# Patient Record
Sex: FEMALE | Race: OTHER | Employment: PART TIME | URBAN - METROPOLITAN AREA
[De-identification: names, ages, dates, MRNs, and addresses within clinical notes are randomized per-mention and may not be internally consistent; named-entity substitution may affect disease eponyms.]

---

## 2024-04-23 ENCOUNTER — OFFICE VISIT (OUTPATIENT)
Dept: GYNECOLOGY | Facility: CLINIC | Age: 34
End: 2024-04-23
Payer: COMMERCIAL

## 2024-04-23 VITALS
DIASTOLIC BLOOD PRESSURE: 72 MMHG | SYSTOLIC BLOOD PRESSURE: 108 MMHG | WEIGHT: 146.2 LBS | BODY MASS INDEX: 29.48 KG/M2 | HEIGHT: 59 IN

## 2024-04-23 DIAGNOSIS — Z01.419 WOMEN'S ANNUAL ROUTINE GYNECOLOGICAL EXAMINATION: Primary | ICD-10-CM

## 2024-04-23 DIAGNOSIS — Z11.3 SCREENING FOR STD (SEXUALLY TRANSMITTED DISEASE): ICD-10-CM

## 2024-04-23 DIAGNOSIS — N76.0 BV (BACTERIAL VAGINOSIS): ICD-10-CM

## 2024-04-23 DIAGNOSIS — N89.8 VAGINAL DISCHARGE: ICD-10-CM

## 2024-04-23 DIAGNOSIS — B96.89 BV (BACTERIAL VAGINOSIS): ICD-10-CM

## 2024-04-23 DIAGNOSIS — N92.0 MENORRHAGIA WITH REGULAR CYCLE: ICD-10-CM

## 2024-04-23 DIAGNOSIS — R10.2 PELVIC PAIN: ICD-10-CM

## 2024-04-23 DIAGNOSIS — N94.6 DYSMENORRHEA: ICD-10-CM

## 2024-04-23 PROCEDURE — S0610 ANNUAL GYNECOLOGICAL EXAMINA: HCPCS | Performed by: OBSTETRICS & GYNECOLOGY

## 2024-04-23 RX ORDER — METRONIDAZOLE 500 MG/1
500 TABLET ORAL EVERY 12 HOURS SCHEDULED
Qty: 14 TABLET | Refills: 0 | Status: SHIPPED | OUTPATIENT
Start: 2024-04-23 | End: 2024-04-30

## 2024-04-23 NOTE — PROGRESS NOTES
"Assessment   Annual well woman exam  Vaginal discharge/BV  Desires pregnancy  Dysmenorrhea.  Menorrhagia with regular cycle  Screening for STDs       Plan   Screening laboratory studies ordered  Pelvic ultrasound ordered  Metronidazole ordered for BV  Recommended no intercourse until completing treatment, no alcohol with this medication  Follow-up in 2 weeks for test results   All questions answered.  Await pap smear results.  Breast self exam technique reviewed and patient encouraged to perform self-exam monthly.  Discussed healthy lifestyle modifications.     Subjective here for annual exam     Johnathon Piper is a 33 y.o. female who presents for annual exam.  Not currently on birth control.  She does start to get pregnant in the near future.  Has painful periods sometimes heavy.  Periods are regular every 28-30 days, lasting 5 days. Dysmenorrhea:moderate, occurring throughout menses. Cyclic symptoms include pelvic pain. No intermenstrual bleeding, spotting, or discharge. The patient reports that there is not domestic violence in her life.     Current contraception: none  History of abnormal Pap smear: no  Family history of uterine or ovarian cancer: no  Regular self breast exam: yes  History of abnormal mammogram: no  Family history of breast cancer: no  History of abnormal lipids: no  Menstrual History:  OB History          0    Para   0    Term   0       0    AB   0    Living   0         SAB   0    IAB   0    Ectopic   0    Multiple   0    Live Births   0                Menarche age: 13  Patient's last menstrual period was 2024.     Review of Systems  Pertinent items are noted in HPI.      Objective no acute distress     /72 (BP Location: Left arm, Patient Position: Sitting, Cuff Size: Standard)   Ht 4' 10.5\" (1.486 m)   Wt 66.3 kg (146 lb 3.2 oz)   LMP 2024   BMI 30.04 kg/m²     General:   alert and oriented, in no acute distress, alert, appears stated age, and cooperative "   Heart: regular rate and rhythm, S1, S2 normal, no murmur, click, rub or gallop   Lungs: clear to auscultation bilaterally   Abdomen: soft, non-tender, without masses or organomegaly   Vulva: normal, Bartholin's, Urethra, [place]'s normal, female escutcheon   Vagina: normal mucosa, no palpable nodules, yellow vaginal discharge wet prep done, clue cells identified consistent with bacterial vaginosis   Cervix: anteverted, no bleeding following Pap, no cervical motion tenderness, no lesions, and nulliparous appearance   Uterus: normal size, anteverted, mobile, non-tender, normal shape and consistency   Adnexa: normal adnexa and no mass, fullness, tenderness   Bilateral breast exam in the sitting and supine position with chaperone present, no visible or palpable breast lesions identified.  No breast masses noted.    No supraclavicular or axillary lymphadenopathy noted.  No nipple discharge.   Reviewed self-breast exam techniques.   Rectal exam,  deferred

## 2024-04-25 DIAGNOSIS — B37.31 VAGINAL YEAST INFECTION: Primary | ICD-10-CM

## 2024-04-25 LAB
CANDIDA RRNA VAG QL PROBE: POSITIVE
G VAGINALIS RRNA GENITAL QL PROBE: POSITIVE
T VAGINALIS RRNA GENITAL QL PROBE: NEGATIVE

## 2024-04-25 RX ORDER — FLUCONAZOLE 150 MG/1
150 TABLET ORAL ONCE
Qty: 1 TABLET | Refills: 0 | Status: SHIPPED | OUTPATIENT
Start: 2024-04-25 | End: 2024-04-25

## 2024-04-29 LAB
C TRACH RRNA CVX QL NAA+PROBE: NEGATIVE
CYTOLOGIST CVX/VAG CYTO: NORMAL
DX ICD CODE: NORMAL
HPV GENOTYPE REFLEX: NORMAL
HPV I/H RISK 4 DNA CVX QL PROBE+SIG AMP: NEGATIVE
N GONORRHOEA RRNA CVX QL NAA+PROBE: NEGATIVE
OTHER STN SPEC: NORMAL
PATH REPORT.FINAL DX SPEC: NORMAL
SL AMB NOTE:: NORMAL
SL AMB SPECIMEN ADEQUACY: NORMAL
SL AMB TEST METHODOLOGY: NORMAL

## 2024-05-05 LAB
ERYTHROCYTE [DISTWIDTH] IN BLOOD BY AUTOMATED COUNT: 14.1 % (ref 11.7–15.4)
HCG INTACT+B SERPL-ACNC: 1389 MIU/ML
HCT VFR BLD AUTO: 40.7 % (ref 34–46.6)
HGB BLD-MCNC: 13.2 G/DL (ref 11.1–15.9)
MCH RBC QN AUTO: 28 PG (ref 26.6–33)
MCHC RBC AUTO-ENTMCNC: 32.4 G/DL (ref 31.5–35.7)
MCV RBC AUTO: 86 FL (ref 79–97)
PLATELET # BLD AUTO: 254 X10E3/UL (ref 150–450)
RBC # BLD AUTO: 4.72 X10E6/UL (ref 3.77–5.28)
TSH SERPL DL<=0.005 MIU/L-ACNC: 1.53 UIU/ML (ref 0.45–4.5)
WBC # BLD AUTO: 7.4 X10E3/UL (ref 3.4–10.8)

## 2024-05-06 ENCOUNTER — HOSPITAL ENCOUNTER (OUTPATIENT)
Dept: ULTRASOUND IMAGING | Facility: HOSPITAL | Age: 34
Discharge: HOME | End: 2024-05-06
Attending: OBSTETRICS & GYNECOLOGY
Payer: COMMERCIAL

## 2024-05-06 DIAGNOSIS — N94.6 DYSMENORRHEA: ICD-10-CM

## 2024-05-06 DIAGNOSIS — R10.2 PELVIC PAIN: ICD-10-CM

## 2024-05-06 DIAGNOSIS — N92.0 MENORRHAGIA WITH REGULAR CYCLE: ICD-10-CM

## 2024-05-06 PROCEDURE — 76801 OB US < 14 WKS SINGLE FETUS: CPT

## 2024-05-07 ENCOUNTER — NURSE TRIAGE (OUTPATIENT)
Age: 34
End: 2024-05-07

## 2024-05-07 NOTE — TELEPHONE ENCOUNTER
Regarding: Pregnant and abdominal pain  ----- Message from Adelaida Rodriguez sent at 5/7/2024 10:27 AM EDT -----  Pt is pregnant, unsure of exact LMP, somewhere between 3/31 and 4/5. Is experiencing pain in lower abdomen and back that is worsening. Needs a .

## 2024-05-07 NOTE — TELEPHONE ENCOUNTER
"Call connected with [place] speaking . # [id number].   Johnathon is reporting increased generalized pelvic pain going down legs, into knees. Pain into back, states pain is getting worse each day and is now severe.  Denies n/v/d. Feels constipated however had b/m this morning. Denies vaginal bleeding.  + Pain with urination and frequency.      Unknown LMP between 3/31-4/5. + HPT 9 days ago when pain started.     Had ultrasound done yesterday-   pending.  Lab results in chart pending review.    Strongly encouraged if  having doubling over type pain or severe pain needs to proceed to ED for evaluation.     Spoke with Una in Dr. Riedel office. She will review with Dr. Riedel.       Pelvic u/s done yesterday, contacted Daniella in reading room, she will request u/s be read today.         Dr. Riedel, please provide additional recommendations    Reason for Disposition   MODERATE-SEVERE abdominal pain (e.g., interferes with normal activities, awakens from sleep)     Patient aware to proceed to ED for severe pain. She would like u/s and labs reviewed prior    Answer Assessment - Initial Assessment Questions  1. LOCATION: \"Where does it hurt?\"       All over pelvic area x 9 days  2. RADIATION: \"Does the pain shoot anywhere else?\" (e.g., chest, back, shoulder)      Down legs into knees, around to back  3. ONSET: \"When did the pain begin?\" (e.g., minutes, hours or days ago)       9 days   4. ONSET: \"Gradual or sudden onset?\"      Gradual, has gotten worse -states cannot continue  5. PATTERN \"Does the pain come and go, or has it been constant since it started?\"       Intermittent-pain keeps awake at night  6. SEVERITY: \"How bad is the pain?\" \"What does it keep you from doing?\"  (e.g., Scale 1-10; mild, moderate, or severe)    - MILD (1-3): doesn't interfere with normal activities, abdomen soft and not tender to touch     - MODERATE (4-7): interferes with normal activities or awakens from sleep, tender to touch     - SEVERE " "(8-10): excruciating pain, doubled over, unable to do any normal activities      severe  7. RECURRENT SYMPTOM: \"Have you ever had this type of stomach pain before?\" If Yes, ask: \"When was the last time?\" and \"What happened that time?\"       no  8. CAUSE: \"What do you think is causing the stomach pain?\"      unknown  9. RELIEVING/AGGRAVATING FACTORS: \"What makes it better or worse?\" (e.g., antacids, bowel movement, movement)      none  10. OTHER SYMPTOMS: \"Has there been any vaginal bleeding, fever, vomiting, diarrhea, or urine problems?\"        Feels constipated- moved bowels this morning. Pain with urination./frequency  11. ROSANNA: \"What date are you expecting to deliver?\"  Unknown LMP unknown between 3/31/2024-4/5.    Protocols used: Pregnancy - Abdominal Pain Less Than 20 Weeks EGA-ADULT-AH    "

## 2024-05-07 NOTE — TELEPHONE ENCOUNTER
Reviewed recent blood test, pregnancy test positive.  Pregnancy reveals intrauterine pregnancy, less than 6 weeks  No signs of tubal pregnancy/ectopic  Recommend she follow-up with physician who still provides obstetrical services at her earliest convenience please call our office at your earliest convenience for a list of eligible providers

## 2024-05-08 NOTE — TELEPHONE ENCOUNTER
Connecting with [place] speaking  #[id number].      Patient had ultrasound completed by Dr. Bello at [facility] that was a confirmed IUP.  Patient was advised to seek obstetric care as soon as she can.  Patient states that her approximate LMP was 3/31/24.  Dating and viability appointment was scheduled.      Patient is stating that she is having persistent pain in her lower abdomen and lower back.  She recently had labs on 4/23/24 done that showed she had bacterial vaginosis and was prescribed metronidazole and fluconazole.  Patient states she completed prescriptions and she is still having persistent lower abdominal pain and lower back pain.      Scheduled patient for a sooner appointment to be checked out.  Advised that if pelvic pain/abdominal pain becomes worse, patient needs to proceed to the urgent care or ER to be further evaluated and quicker evaluation.  Patient verbalized understanding and voiced appreciation for phone call.

## 2024-05-10 ENCOUNTER — OFFICE VISIT (OUTPATIENT)
Age: 34
End: 2024-05-10
Payer: COMMERCIAL

## 2024-05-10 ENCOUNTER — TELEPHONE (OUTPATIENT)
Age: 34
End: 2024-05-10

## 2024-05-10 ENCOUNTER — APPOINTMENT (OUTPATIENT)
Dept: LAB | Facility: HOSPITAL | Age: 34
End: 2024-05-10
Payer: COMMERCIAL

## 2024-05-10 VITALS
SYSTOLIC BLOOD PRESSURE: 118 MMHG | BODY MASS INDEX: 29.96 KG/M2 | HEIGHT: 59 IN | DIASTOLIC BLOOD PRESSURE: 70 MMHG | WEIGHT: 148.6 LBS

## 2024-05-10 DIAGNOSIS — N92.6 IRREGULAR MENSTRUAL CYCLE: Primary | ICD-10-CM

## 2024-05-10 DIAGNOSIS — O26.891 PELVIC PAIN AFFECTING PREGNANCY IN FIRST TRIMESTER, ANTEPARTUM: ICD-10-CM

## 2024-05-10 DIAGNOSIS — N92.6 MISSED MENSES: Primary | ICD-10-CM

## 2024-05-10 DIAGNOSIS — R10.2 PELVIC PAIN AFFECTING PREGNANCY IN FIRST TRIMESTER, ANTEPARTUM: ICD-10-CM

## 2024-05-10 LAB
B-HCG SERPL-ACNC: ABNORMAL MIU/ML (ref 0–5)
SL AMB POCT URINE HCG: POSITIVE

## 2024-05-10 PROCEDURE — 81025 URINE PREGNANCY TEST: CPT | Performed by: OBSTETRICS & GYNECOLOGY

## 2024-05-10 PROCEDURE — 36415 COLL VENOUS BLD VENIPUNCTURE: CPT

## 2024-05-10 PROCEDURE — 99214 OFFICE O/P EST MOD 30 MIN: CPT | Performed by: OBSTETRICS & GYNECOLOGY

## 2024-05-10 PROCEDURE — 84702 CHORIONIC GONADOTROPIN TEST: CPT

## 2024-05-10 RX ORDER — CEPHALEXIN 500 MG/1
CAPSULE ORAL
COMMUNITY
Start: 2024-02-16

## 2024-05-10 RX ORDER — TRIAMCINOLONE ACETONIDE 1 MG/G
CREAM TOPICAL
COMMUNITY
Start: 2024-02-16

## 2024-05-10 NOTE — PROGRESS NOTES
Assessment Johnathon was seen today for pelvic pain.    Diagnoses and all orders for this visit:    Missed menses  -     POCT urine HCG  -     hCG, quantitative; Future  -     Cancel: hCG, quantitative    Pelvic pain affecting pregnancy in first trimester, antepartum         Plan  Today I reviewed with patient and mother her  BHCG from  5/4 showing level 1389 and her  US done showing intrauterine gestational sac with yolk sac present, fetal pole not currently identified. Follow-up recommended..  This US was done  on 5/6/2024. Aware too early to repeat in order to confirm if viable pregnancy  . Recommend repeating BHCG and scheduled for  viability scan with me   Recommend initiation of  PNV   We also discussed pain and bleeding precautions with patient and mother            Subjective   Johnathon HECTOR is a 34 y.o. female here for a problem visit.  Patient is complaining of not understanding what is going on with her pregnancy  . She state she was seen at Dr Riedel office but was not fully understanding plan of care secondary to language barrier. She states LMP 3/31/2024  with positive UPT . Was seen for US at  previous GYN and told too early . (This was on  5/6/2024)   State she has been overall ok except for experiencing intense pain at times in her mid pelvis and back . Today not having pain . Denies vaginal bleeding  . This is a desired pregnancy and she currently is  not on any medication  There is no problem list on file for this patient.      Gynecologic History  Patient's last menstrual period was 03/31/2024.  The current method of family planning is none.    No past medical history on file.  No past surgical history on file.  No family history on file.  Social History     Socioeconomic History    Marital status: /Civil Union     Spouse name: Not on file    Number of children: Not on file    Years of education: Not on file    Highest education level: Not on file   Occupational History    Not on file    Tobacco Use    Smoking status: Never    Smokeless tobacco: Never   Substance and Sexual Activity    Alcohol use: Not Currently    Drug use: Never    Sexual activity: Yes     Partners: Male   Other Topics Concern    Not on file   Social History Narrative    Not on file     Social Determinants of Health     Financial Resource Strain: Not on file   Food Insecurity: Not on file   Transportation Needs: Not on file   Physical Activity: Not on file   Stress: Not on file   Social Connections: Not on file   Intimate Partner Violence: Not on file   Housing Stability: Not on file     No Known Allergies    Current Outpatient Medications:     cephalexin (KEFLEX) 500 mg capsule, TAKE 2 CAPSULE BY ORAL ROUTE 2 TIMES EVERY DAY FOR URINARY TRACT INFECTION (Patient not taking: Reported on 5/10/2024), Disp: , Rfl:     triamcinolone (KENALOG) 0.1 % cream, PLEASE SEE ATTACHED FOR DETAILED DIRECTIONS (Patient not taking: Reported on 5/10/2024), Disp: , Rfl:     Review of Systems  Constitutional :no fever, feels well, no tiredness, no recent weight gain or loss  ENT: no ear ache, no loss of hearing, no nosebleeds or nasal discharge, no sore throat or hoarseness.  Cardiovascular: no complaints of slow or fast heart beat, no chest pain, no palpitations, no leg claudication or lower extremity edema.  Respiratory: no complaints of shortness of shortness of breath, no PURVIS  Breasts:no complaints of breast pain, breast lump, or nipple discharge  Gastrointestinal: no complaints of abdominal pain, constipation, nausea, vomiting, or diarrhea or bloody stools  Genitourinary : no complaints of dysuria, incontinence, pelvic pain, no dysmenorrhea, vaginal discharge or abnormal vaginal bleeding and as noted in HPI.  Musculoskeletal: no complaints of arthralgia, no myalgia, no joint swelling or stiffness, no limb pain or swelling.  Integumentary: no complaints of skin rash or lesion, itching or dry skin  Neurological: no complaints of headache, no  "confusion, no numbness or tingling, no dizziness or fainting     Objective     /70   Ht 4' 10.5\" (1.486 m)   Wt 67.4 kg (148 lb 9.6 oz)   LMP 03/31/2024   BMI 30.53 kg/m²     General:   appears stated age, cooperative, alert normal mood and affect   Lungs: Unlabored breathing     Abdomen: soft, non-tender, without masses or organomegaly   Psychiatric orientation to person, place, and time: normal. mood and affect: normal      "

## 2024-05-10 NOTE — TELEPHONE ENCOUNTER
Pt called tors 5/30/24 appt.  For later in the day or 5/31/24.  Next available appt with same physician was 6/4/24. Pt keeping 5/30/24 appt

## 2024-05-30 ENCOUNTER — ULTRASOUND (OUTPATIENT)
Dept: OBGYN CLINIC | Facility: MEDICAL CENTER | Age: 34
End: 2024-05-30
Payer: COMMERCIAL

## 2024-05-30 VITALS
WEIGHT: 143.6 LBS | DIASTOLIC BLOOD PRESSURE: 56 MMHG | HEIGHT: 58 IN | SYSTOLIC BLOOD PRESSURE: 100 MMHG | BODY MASS INDEX: 30.14 KG/M2

## 2024-05-30 DIAGNOSIS — N92.6 MISSED MENSES: ICD-10-CM

## 2024-05-30 DIAGNOSIS — O21.9 NAUSEA AND VOMITING IN PREGNANCY: Primary | ICD-10-CM

## 2024-05-30 DIAGNOSIS — Z36.9 FIRST TRIMESTER SCREENING: ICD-10-CM

## 2024-05-30 PROCEDURE — 99214 OFFICE O/P EST MOD 30 MIN: CPT | Performed by: OBSTETRICS & GYNECOLOGY

## 2024-05-30 PROCEDURE — 76817 TRANSVAGINAL US OBSTETRIC: CPT | Performed by: OBSTETRICS & GYNECOLOGY

## 2024-05-30 RX ORDER — DOXYLAMINE SUCCINATE AND PYRIDOXINE HYDROCHLORIDE, DELAYED RELEASE TABLETS 10 MG/10 MG 10; 10 MG/1; MG/1
1 TABLET, DELAYED RELEASE ORAL DAILY
Qty: 30 TABLET | Refills: 2 | Status: SHIPPED | OUTPATIENT
Start: 2024-05-30 | End: 2024-06-29

## 2024-05-30 NOTE — PROGRESS NOTES
Assessment Johnathon was seen today for pregnancy ultrasound.    Diagnoses and all orders for this visit:    Nausea and vomiting in pregnancy  -     Doxylamine-Pyridoxine 10-10 MG TBEC; Take 1 tablet by mouth in the morning    First trimester screening  -     Ambulatory Referral to Maternal Fetal Medicine; Future    Missed menses  -     AMB US OB < 14 weeks single or first gestation level 1         Plan  Live  IUP   EDC  1/5/2025 based on LMP  We discussed small frequent meals for nausea and vomitng , to call if  not tolerating anything   Will schedule OB intake   Referral to [facility] given as she is interested in genetic screening   Subjective   Johnathon HECTOR is a 34 y.o. female here for a problem visit.  Patient is complaining of missed menses , LMP 3/31/2024 . States has been having nausea and vomiting as well as lower pelvic cramps  .There is no problem list on file for this patient.      Gynecologic History  Patient's last menstrual period was 03/31/2024 (exact date).  The current method of family planning is none.    No past medical history on file.  No past surgical history on file.  No family history on file.  Social History     Socioeconomic History    Marital status: /Civil Union     Spouse name: Not on file    Number of children: Not on file    Years of education: Not on file    Highest education level: Not on file   Occupational History    Not on file   Tobacco Use    Smoking status: Never    Smokeless tobacco: Never   Substance and Sexual Activity    Alcohol use: Not Currently    Drug use: Never    Sexual activity: Yes     Partners: Male   Other Topics Concern    Not on file   Social History Narrative    Not on file     Social Determinants of Health     Financial Resource Strain: Not on file   Food Insecurity: Not on file   Transportation Needs: Not on file   Physical Activity: Not on file   Stress: Not on file   Social Connections: Not on file   Intimate Partner Violence: Not on file   Housing  "Stability: Not on file     No Known Allergies    Current Outpatient Medications:     cephalexin (KEFLEX) 500 mg capsule, , Disp: , Rfl:     triamcinolone (KENALOG) 0.1 % cream, , Disp: , Rfl:     Review of Systems  Constitutional :no fever, feels well, no tiredness, no recent weight gain or loss  ENT: no ear ache, no loss of hearing, no nosebleeds or nasal discharge, no sore throat or hoarseness.  Cardiovascular: no complaints of slow or fast heart beat, no chest pain, no palpitations, no leg claudication or lower extremity edema.  Respiratory: no complaints of shortness of shortness of breath, no PURVIS  Breasts:no complaints of breast pain, breast lump, or nipple discharge  Gastrointestinal: no complaints of abdominal pain, constipation, nausea, vomiting, or diarrhea or bloody stools  Genitourinary : no complaints of dysuria, incontinence, pelvic pain, no dysmenorrhea, vaginal discharge or abnormal vaginal bleeding and as noted in HPI.  Musculoskeletal: no complaints of arthralgia, no myalgia, no joint swelling or stiffness, no limb pain or swelling.  Integumentary: no complaints of skin rash or lesion, itching or dry skin  Neurological: no complaints of headache, no confusion, no numbness or tingling, no dizziness or fainting     Objective     /56   Ht 4' 10\" (1.473 m)   Wt 65.1 kg (143 lb 9.6 oz)   LMP 03/31/2024 (Exact Date)   BMI 30.01 kg/m²     General:   appears stated age, cooperative, alert normal mood and affect   Lungs: Unlabored     Abdomen: soft, non-tender, without masses or organomegaly   Vulva: normal, normal female genitalia, Bartholin's, Urethra, [place] normal, no lesions, normal female hair distribution, no clitoral enlargement   Psychiatric orientation to person, place, and time: normal. mood and affect: normal      "

## 2024-05-31 ENCOUNTER — TELEPHONE (OUTPATIENT)
Age: 34
End: 2024-05-31

## 2024-06-11 ENCOUNTER — TELEPHONE (OUTPATIENT)
Age: 34
End: 2024-06-11

## 2024-06-11 NOTE — PATIENT INSTRUCTIONS
¡¡Felicidades!! Revise nuestra Guía esencial para el embarazo en el sitio web de nuestra red.    [facility] Pregnancy Essentials Guide  [facility] ([email or web address])

## 2024-06-11 NOTE — PROGRESS NOTES
OB INTAKE INTERVIEW 2024    Patient is 34 y.o. who presents for OB intake at 10w3d.  She is not accompanied by anyone during this encounter.  The father of her baby (Edmund) is involved in the pregnancy.      Patient's last menstrual period was 2024 (exact date).  Ultrasound: Measured 8 weeks 3 days on 2024  Estimated Date of Delivery: 2025 confirmed by dating ultrasound.    Signs/Symptoms of Pregnancy  Current pregnancy symptoms: fatigue and nausea  Constipation YES  Headaches YES  Cramping/spotting no  PICA cravings no    Diabetes-  Body mass index is 30.01 kg/m².  If patient has 1 or more, please order early 1 hour GTT  History of GDM no  BMI >35 no  History of PCOS or current metformin use no  History of LGA/macrosomic infant (4000g/9lbs) no    If patient has 2 or more, please order early 1 hour GTT  BMI>30 YES  AMA no  First degree relative with type 2 diabetes no  History of chronic HTN, hyperlipidemia, elevated A1C no  High risk race (, , ,  or ) YES    Hypertension- if you answer yes to any of the following, please order baseline preeclampsia labs (cbc, comprehensive metabolic panel, urine protein creatinine ratio, uric acid)  History of of chronic HTN no  History of gestational HTN no  History of preeclampsia, eclampsia, or HELLP syndrome no  History of diabetes no  History of lupus, autoimmune disease, kidney disease no    Thyroid- if yes order TSH with reflex T4  History of thyroid disease no    Bleeding Disorder or Hx of DVT-patient or first degree relative with history of. Order the following if not done previously.   (Factor V, antithrombin III, prothrombin gene mutation, protein C and S Ag, lupus anticoagulant, anticardiolipin, beta-2 glycoprotein)   no    OB/GYN-  History of abnormal pap smear no  - 2024 WNL    Date of last pap smear 2024  History of HPV no  History of Herpes/HSV no  History of other  STI (gonorrhea, chlamydia, trich) no  History of prior  no  History of prior  no  History of  delivery prior to 36 weeks 6 days no  History of blood transfusion no  Ok for blood transfusion YES    Substance screening-   History of tobacco use no  Currently using tobacco no  Currently using alcohol no  Presently using drugs no  Past drug use  no  IV drug use- no  Partner drug use no  Parent/Family drug use no    Substance Use Screen Level- no risk    MRSA Screening-   Does the pt have a hx of MRSA? no    Immunizations:  Influenza vaccine given this season YES - Received in November  Discussed Tdap vaccine YES   COVID Vaccine YES x2    Genetic/[facility]-  Do you or your partner have a history of any of the following in yourselves or first degree relatives?  Cystic fibrosis no  Spinal muscular atrophy no  Hemoglobinopathy/Sickle Cell/Thalassemia no  Fragile X Intellectual Disability no    If no, discuss Carrier Screening being completed once in a lifetime as a standard of care lab test. Place orders for Cystic Fibrosis Gene Test (SKA922) and Spinal Muscular Atrophy DNA ([id number]).  Patient does not desire testing for Cystic Fibrosis and Spinal Muscular Atrophy.  Orders placed.    Appointment for Nuchal Translucency Ultrasound at [facility]  has not been scheduled. Encouraged to schedule.    Interview education  [facility] Pregnancy Essentials Book reviewed, discussed and attached to their AVS YES     Prenatal lab work scripts YES    Extra labs ordered: Cystic Fibrosis gene test, Spinal muscular atrophy DNA, and 1 hour GTT    Aspirin/Preeclampsia Screen    Risk Level Risk Factor Recommendation   LOW Prior Uncomplicated full-term delivery no No Aspirin recommendation        MODERATE Nulliparity YES Recommend low-dose aspirin if     BMI>30 YES 2 or more moderate risk factors    Family History Preeclampsia (mother/sister) no     35yr old or greater no      or Low Socioeconomic no     IVF Pregnancy  no      Personal History Risks (low birth weight, prior adverse preg outcome, >10yr preg interval) no         HIGH History of Preeclampsia no Recommend low-dose aspirin if     Multifetal gestation no 1 or more high risk factors    Chronic HTN no     Type 1 or 2 Diabetes no     Renal Disease no     Autoimmune Disease  no      Contraindications to ASA therapy:  NSAID/ ASA allergy: no  Nasal polyps: no  Asthma with history of ASA induced bronchospasm: no  Relative contraindications:  History of GI bleed: no  Active peptic ulcer disease: no  Severe hepatic dysfunction: no    Patient does meet recommendation to take ASA 162mg during this pregnancy from 12-36wks to lower her risk of preeclampsia.  Instructions given and patient verbalized understanding.    The patient has a history now or in prior pregnancy notable for: none    Details that I feel the provider should be aware of: Johnathon came in for her OB intake at 10w3d. Patient c/o fatigue, nausea, constipation and headaches. Safe medications to take during pregnancy and warning signs reviewed.  Patient moved to the  in December from . Patient reports may have had multiple losses in the past but it was never confirmed- denies taking pregnancy tests. Prenatal panel and early 1 hour glucose ordered. Patient has not scheduled NT- Encouraged to call and schedule.    PN1 visit scheduled. The patient was oriented to our practice, the navigator role, reviewed delivering physicians and [facility] for delivery. All questions were answered.    Interviewed by: Rivera Chopra RN

## 2024-06-12 ENCOUNTER — APPOINTMENT (OUTPATIENT)
Dept: LAB | Facility: MEDICAL CENTER | Age: 34
End: 2024-06-12
Payer: COMMERCIAL

## 2024-06-12 ENCOUNTER — TELEPHONE (OUTPATIENT)
Age: 34
End: 2024-06-12

## 2024-06-12 ENCOUNTER — INITIAL PRENATAL (OUTPATIENT)
Dept: OBGYN CLINIC | Facility: MEDICAL CENTER | Age: 34
End: 2024-06-12

## 2024-06-12 VITALS
WEIGHT: 143.6 LBS | HEIGHT: 58 IN | DIASTOLIC BLOOD PRESSURE: 54 MMHG | BODY MASS INDEX: 30.14 KG/M2 | SYSTOLIC BLOOD PRESSURE: 98 MMHG

## 2024-06-12 DIAGNOSIS — Z34.01 ENCOUNTER FOR SUPERVISION OF NORMAL FIRST PREGNANCY IN FIRST TRIMESTER: Primary | ICD-10-CM

## 2024-06-12 DIAGNOSIS — Z31.430 ENCOUNTER OF FEMALE FOR TESTING FOR GENETIC DISEASE CARRIER STATUS FOR PROCREATIVE MANAGEMENT: ICD-10-CM

## 2024-06-12 DIAGNOSIS — Z34.01 ENCOUNTER FOR SUPERVISION OF NORMAL FIRST PREGNANCY IN FIRST TRIMESTER: ICD-10-CM

## 2024-06-12 LAB
ABO GROUP BLD: NORMAL
BACTERIA UR QL AUTO: ABNORMAL /HPF
BASOPHILS # BLD AUTO: 0.02 THOUSANDS/ÂΜL (ref 0–0.1)
BASOPHILS NFR BLD AUTO: 0 % (ref 0–1)
BILIRUB UR QL STRIP: NEGATIVE
BLD GP AB SCN SERPL QL: NEGATIVE
CLARITY UR: CLEAR
COLOR UR: ABNORMAL
EOSINOPHIL # BLD AUTO: 0.08 THOUSAND/ÂΜL (ref 0–0.61)
EOSINOPHIL NFR BLD AUTO: 1 % (ref 0–6)
ERYTHROCYTE [DISTWIDTH] IN BLOOD BY AUTOMATED COUNT: 13.5 % (ref 11.6–15.1)
GLUCOSE UR STRIP-MCNC: NEGATIVE MG/DL
HCT VFR BLD AUTO: 38.4 % (ref 34.8–46.1)
HGB BLD-MCNC: 12.5 G/DL (ref 11.5–15.4)
HGB UR QL STRIP.AUTO: NEGATIVE
IMM GRANULOCYTES # BLD AUTO: 0.04 THOUSAND/UL (ref 0–0.2)
IMM GRANULOCYTES NFR BLD AUTO: 0 % (ref 0–2)
KETONES UR STRIP-MCNC: NEGATIVE MG/DL
LEUKOCYTE ESTERASE UR QL STRIP: ABNORMAL
LYMPHOCYTES # BLD AUTO: 3.19 THOUSANDS/ÂΜL (ref 0.6–4.47)
LYMPHOCYTES NFR BLD AUTO: 30 % (ref 14–44)
MCH RBC QN AUTO: 28.3 PG (ref 26.8–34.3)
MCHC RBC AUTO-ENTMCNC: 32.6 G/DL (ref 31.4–37.4)
MCV RBC AUTO: 87 FL (ref 82–98)
MONOCYTES # BLD AUTO: 0.56 THOUSAND/ÂΜL (ref 0.17–1.22)
MONOCYTES NFR BLD AUTO: 5 % (ref 4–12)
MUCOUS THREADS UR QL AUTO: ABNORMAL
NEUTROPHILS # BLD AUTO: 6.61 THOUSANDS/ÂΜL (ref 1.85–7.62)
NEUTS SEG NFR BLD AUTO: 64 % (ref 43–75)
NITRITE UR QL STRIP: NEGATIVE
NON-SQ EPI CELLS URNS QL MICRO: ABNORMAL /HPF
NRBC BLD AUTO-RTO: 0 /100 WBCS
PH UR STRIP.AUTO: 6 [PH]
PLATELET # BLD AUTO: 260 THOUSANDS/UL (ref 149–390)
PMV BLD AUTO: 11.6 FL (ref 8.9–12.7)
PROT UR STRIP-MCNC: NEGATIVE MG/DL
RBC # BLD AUTO: 4.41 MILLION/UL (ref 3.81–5.12)
RBC #/AREA URNS AUTO: ABNORMAL /HPF
RH BLD: POSITIVE
RUBV IGG SERPL IA-ACNC: 154.1 IU/ML
SP GR UR STRIP.AUTO: 1.01 (ref 1–1.03)
SPECIMEN EXPIRATION DATE: NORMAL
UROBILINOGEN UR STRIP-ACNC: <2 MG/DL
WBC # BLD AUTO: 10.5 THOUSAND/UL (ref 4.31–10.16)
WBC #/AREA URNS AUTO: ABNORMAL /HPF

## 2024-06-12 PROCEDURE — 86901 BLOOD TYPING SEROLOGIC RH(D): CPT

## 2024-06-12 PROCEDURE — OBC

## 2024-06-12 PROCEDURE — 86850 RBC ANTIBODY SCREEN: CPT

## 2024-06-12 PROCEDURE — 86780 TREPONEMA PALLIDUM: CPT

## 2024-06-12 PROCEDURE — 87389 HIV-1 AG W/HIV-1&-2 AB AG IA: CPT

## 2024-06-12 PROCEDURE — 87086 URINE CULTURE/COLONY COUNT: CPT

## 2024-06-12 PROCEDURE — 36415 COLL VENOUS BLD VENIPUNCTURE: CPT

## 2024-06-12 PROCEDURE — 86803 HEPATITIS C AB TEST: CPT

## 2024-06-12 PROCEDURE — 81001 URINALYSIS AUTO W/SCOPE: CPT

## 2024-06-12 PROCEDURE — 86706 HEP B SURFACE ANTIBODY: CPT

## 2024-06-12 PROCEDURE — 86900 BLOOD TYPING SEROLOGIC ABO: CPT

## 2024-06-12 PROCEDURE — 85025 COMPLETE CBC W/AUTO DIFF WBC: CPT

## 2024-06-12 PROCEDURE — 86762 RUBELLA ANTIBODY: CPT

## 2024-06-12 PROCEDURE — 87340 HEPATITIS B SURFACE AG IA: CPT

## 2024-06-12 RX ORDER — ACETAMINOPHEN 500 MG
500 TABLET ORAL EVERY 6 HOURS PRN
COMMUNITY

## 2024-06-12 NOTE — TELEPHONE ENCOUNTER
Pt contacted the office due to her missing the [employer] that was ordered for her to bring her to the appointment. Patient was warm transferred to the office for further assistance

## 2024-06-12 NOTE — TELEPHONE ENCOUNTER
Patient called in stating that she needs an uber to her OB appointment. Charley GAMBLE contacted the office and spoke to Polina. Polina stated that she will call the Uber now, and send one to her address that was confirmed. Patient was notified and patient stated that she didn't want the uber to arrive until 2:30 as she is getting ready for the appointment. Patient was notified that the Uber is being sent to her address now, pt stated she didn't want them to come now, pt was advised that the uber usually arrives an hour before a scheduled appointment Pt verbalized understanding, no further questions at this time        # [phone]  used for this call.

## 2024-06-13 LAB
BACTERIA UR CULT: NORMAL
HBV SURFACE AB SER-ACNC: 260 MIU/ML
HBV SURFACE AG SER QL: NORMAL
HCV AB SER QL: NORMAL
HIV 1+2 AB+HIV1 P24 AG SERPL QL IA: NORMAL
HIV 2 AB SERPL QL IA: NORMAL
HIV1 AB SERPL QL IA: NORMAL
HIV1 P24 AG SERPL QL IA: NORMAL
TREPONEMA PALLIDUM IGG+IGM AB [PRESENCE] IN SERUM OR PLASMA BY IMMUNOASSAY: NORMAL

## 2024-06-14 ENCOUNTER — TELEPHONE (OUTPATIENT)
Age: 34
End: 2024-06-14

## 2024-06-14 NOTE — TELEPHONE ENCOUNTER
----- Message from Rivera NAQVI sent at 6/12/2024  5:38 PM EDT -----  Regarding: NT appointment  Hello!    I saw Johnathon for her intake today. She was encouraged to call you tomorrow to schedule her NT visit. I am hoping she calls to schedule but in case she does not, please reach out to her as well to schedule appointment. Thank you!

## 2024-06-18 ENCOUNTER — TELEPHONE (OUTPATIENT)
Age: 34
End: 2024-06-18

## 2024-06-26 ENCOUNTER — LAB (OUTPATIENT)
Dept: LAB | Facility: HOSPITAL | Age: 34
End: 2024-06-26
Payer: COMMERCIAL

## 2024-06-26 ENCOUNTER — APPOINTMENT (OUTPATIENT)
Dept: LAB | Facility: HOSPITAL | Age: 34
End: 2024-06-26
Attending: OBSTETRICS & GYNECOLOGY
Payer: COMMERCIAL

## 2024-06-26 DIAGNOSIS — Z31.430 ENCOUNTER OF FEMALE FOR TESTING FOR GENETIC DISEASE CARRIER STATUS FOR PROCREATIVE MANAGEMENT: ICD-10-CM

## 2024-06-26 DIAGNOSIS — R73.09 ELEVATED GLUCOSE TOLERANCE TEST: Primary | ICD-10-CM

## 2024-06-26 DIAGNOSIS — Z34.01 ENCOUNTER FOR SUPERVISION OF NORMAL FIRST PREGNANCY IN FIRST TRIMESTER: ICD-10-CM

## 2024-06-26 LAB — GLUCOSE 1H P 50 G GLC PO SERPL-MCNC: 137 MG/DL (ref 70–134)

## 2024-06-26 PROCEDURE — 81220 CFTR GENE COM VARIANTS: CPT

## 2024-06-26 PROCEDURE — 36415 COLL VENOUS BLD VENIPUNCTURE: CPT

## 2024-06-26 PROCEDURE — 81329 SMN1 GENE DOS/DELETION ALYS: CPT

## 2024-06-26 PROCEDURE — 82950 GLUCOSE TEST: CPT

## 2024-06-28 ENCOUNTER — INITIAL PRENATAL (OUTPATIENT)
Dept: OBGYN CLINIC | Facility: MEDICAL CENTER | Age: 34
End: 2024-06-28

## 2024-06-28 VITALS — DIASTOLIC BLOOD PRESSURE: 60 MMHG | WEIGHT: 143.7 LBS | BODY MASS INDEX: 30.03 KG/M2 | SYSTOLIC BLOOD PRESSURE: 104 MMHG

## 2024-06-28 DIAGNOSIS — Z3A.12 12 WEEKS GESTATION OF PREGNANCY: Primary | ICD-10-CM

## 2024-06-28 PROCEDURE — 87591 N.GONORRHOEAE DNA AMP PROB: CPT | Performed by: STUDENT IN AN ORGANIZED HEALTH CARE EDUCATION/TRAINING PROGRAM

## 2024-06-28 PROCEDURE — 87491 CHLMYD TRACH DNA AMP PROBE: CPT | Performed by: STUDENT IN AN ORGANIZED HEALTH CARE EDUCATION/TRAINING PROGRAM

## 2024-06-28 PROCEDURE — PNV: Performed by: STUDENT IN AN ORGANIZED HEALTH CARE EDUCATION/TRAINING PROGRAM

## 2024-06-28 NOTE — PROGRESS NOTES
Initial Prenatal Visit  [facility] [address], [place], [place]    Assessment/Plan:  Johnathon is a 34 y.o. year old  at 12w5d who presents for initial prenatal visit.     Supervision of normal pregnancy  - Prenatal labs reviewed and notable for elevated 1 hr glucose.  Blood type: B positive  - Aneuploidy screening discussed.  Patient opts for cell free DNA testing.  - Routine cervical cancer screening: Pap Up to date.  - Routine STI Screening: GC/Chlamydia sent today.  HIV/Hep B/Syphilis ordered in prenatal panel.  - Patient Education: Patient was counseled regarding diet, exercise, weight gain, foods to avoid, vaccines in pregnancy, aneuploidy screening, travel precautions to include seat belt use and VTE risk reduction.  She has been provided our pregnancy packet which includes how and when to contact providers, medication recommendations, dietary suggestions, breastfeeding information as well as websites for additional information, hospital and delivery concerns.       Additional Pregnancy Problems:   1. 12 weeks gestation of pregnancy  -     Chlamydia/GC amplified DNA by PCR        Subjective:   CC:  Desires prenatal care  Johnathon English is a 34 y.o.  female who presents for prenatal care.  Pregnancy ROS: Denies leakage of fluid, pelvic pain, or vaginal bleeding.  Reports some nausea/vomiting.    The following portions of the patient's history were reviewed and updated as appropriate: allergies, current medications, past family history, past medical history, obstetric history, gynecologic history, past social history, past surgical history and problem list.      Objective:  /60   Wt 65.2 kg (143 lb 11.2 oz)   LMP 2024 (Exact Date)   BMI 30.03 kg/m²   Pregravid Weight/BMI: 62.6 kg (138 lb) (BMI 28.85)  Current Weight: 65.2 kg (143 lb 11.2 oz)   Total Weight Gain: 2.586 kg (5 lb 11.2 oz)   Pre- Vitals      Flowsheet Row Most Recent Value    Prenatal Assessment    Fetal Heart Rate 164   Prenatal Vitals    Blood Pressure 104/60   Weight - Scale 65.2 kg (143 lb 11.2 oz)   Urine Albumin/Glucose    Dilation/Effacement/Station    Vaginal Drainage    Draining Fluid No   Edema    LLE Edema None   RLE Edema Trace   Facial Edema None           General: Well appearing, no distress  Respiratory: Normal respiratory rate, lungs clear to auscultation, no wheezing or rales  Cardiovascular: Regular rate and rhythm, no murmurs, rubs, or gallops  Breasts: Normal bilaterally, nontender without masses, asymmetry, or nipple discharge.  Abdomen: Soft, gravid, nontender  : Urethra normal. Normal labia majora and minora. Vagina normal.  No vaginal bleeding. No vaginal discharge. Cervix visually closed.   Extremities: Warm and well perfused.  Non tender.  No edema.

## 2024-07-01 LAB
C TRACH DNA SPEC QL NAA+PROBE: NEGATIVE
N GONORRHOEA DNA SPEC QL NAA+PROBE: NEGATIVE

## 2024-07-02 LAB
CITATION REF LAB TEST: NORMAL
CLINICAL INFO: NORMAL
ETHNIC BACKGROUND STATED: NORMAL
GENE DIS ANL CARRIER INTERP-IMP: NORMAL
GENE MUT TESTED BLD/T: NORMAL
LAB DIRECTOR NAME PROVIDER: NORMAL
REASON FOR REFERRAL (NARRATIVE): NORMAL
RECOMMENDATION PATIENT DOC-IMP: NORMAL
REF LAB TEST METHOD: NORMAL
SERVICE CMNT-IMP: NORMAL
SMN1 GENE MUT ANL BLD/T: NORMAL
SPECIMEN SOURCE: NORMAL

## 2024-07-05 ENCOUNTER — ROUTINE PRENATAL (OUTPATIENT)
Dept: PERINATAL CARE | Facility: OTHER | Age: 34
End: 2024-07-05
Payer: COMMERCIAL

## 2024-07-05 VITALS
HEIGHT: 58 IN | HEART RATE: 80 BPM | BODY MASS INDEX: 29.39 KG/M2 | DIASTOLIC BLOOD PRESSURE: 60 MMHG | SYSTOLIC BLOOD PRESSURE: 98 MMHG | WEIGHT: 140 LBS

## 2024-07-05 DIAGNOSIS — E66.3 OVERWEIGHT: Primary | ICD-10-CM

## 2024-07-05 DIAGNOSIS — Z13.79 ENCOUNTER FOR OTHER SCREENING FOR GENETIC AND CHROMOSOMAL ANOMALIES: ICD-10-CM

## 2024-07-05 DIAGNOSIS — Z3A.13 13 WEEKS GESTATION OF PREGNANCY: ICD-10-CM

## 2024-07-05 DIAGNOSIS — Z36.82 NUCHAL TRANSLUCENCY OF FETUS ON PRENATAL ULTRASOUND: ICD-10-CM

## 2024-07-05 LAB
CFTR FULL MUT ANL BLD/T SEQ: NORMAL
CITATION REF LAB TEST: NORMAL
CLINICAL INFO: NORMAL
ETHNIC BACKGROUND STATED: NORMAL
GENE DIS ANL CARRIER INTERP-IMP: NORMAL
INDICATION: NORMAL
LAB DIRECTOR NAME PROVIDER: NORMAL
RECOMMENDATION PATIENT DOC-IMP: NORMAL
REF LAB TEST METHOD: NORMAL
SERVICE CMNT-IMP: NORMAL
SPECIMEN SOURCE: NORMAL

## 2024-07-05 PROCEDURE — 76813 OB US NUCHAL MEAS 1 GEST: CPT | Performed by: OBSTETRICS & GYNECOLOGY

## 2024-07-05 PROCEDURE — 76801 OB US < 14 WKS SINGLE FETUS: CPT | Performed by: OBSTETRICS & GYNECOLOGY

## 2024-07-05 PROCEDURE — 99203 OFFICE O/P NEW LOW 30 MIN: CPT | Performed by: OBSTETRICS & GYNECOLOGY

## 2024-07-05 NOTE — LETTER
July 8, 2024     Blanca Ashby MD  [address] [place] [address]  [address] [place] [address]    Patient: Johnathon English   YOB: 1990   Date of Visit: 7/5/2024       Dear Dr. Ashby:    Thank you for referring Johnathon Englsih to me for evaluation. Below are my notes for this consultation.    If you have questions, please do not hesitate to call me. I look forward to following your patient along with you.         Sincerely,        Faheem Sanches MD        CC: No Recipients    Faheem Sanches MD  7/8/2024  3:45 PM  Sign when Signing Visit  A fetal ultrasound was completed. See Ob procedures in Epic for an interpretation and recommendations. Do not hesitate to contact us in [facility] if you have questions.    Faheem Sanches MD, MSCE  Maternal Fetal Medicine

## 2024-07-05 NOTE — TELEPHONE ENCOUNTER
called for a refill on Doxylamine-Pyridoxine 10-10 MG TBEC . Patient has a refill from 05/30.  was advised to call pharmacy for the refill.

## 2024-07-08 PROBLEM — Z3A.13 13 WEEKS GESTATION OF PREGNANCY: Status: ACTIVE | Noted: 2024-07-08

## 2024-07-08 PROBLEM — E66.3 OVERWEIGHT: Status: ACTIVE | Noted: 2024-07-08

## 2024-07-11 ENCOUNTER — OFFICE VISIT (OUTPATIENT)
Dept: URGENT CARE | Facility: MEDICAL CENTER | Age: 34
End: 2024-07-11
Payer: COMMERCIAL

## 2024-07-11 ENCOUNTER — HOSPITAL ENCOUNTER (EMERGENCY)
Facility: HOSPITAL | Age: 34
Discharge: HOME | End: 2024-07-11
Attending: EMERGENCY MEDICINE
Payer: COMMERCIAL

## 2024-07-11 VITALS
WEIGHT: 143.52 LBS | OXYGEN SATURATION: 100 % | RESPIRATION RATE: 16 BRPM | DIASTOLIC BLOOD PRESSURE: 53 MMHG | HEART RATE: 67 BPM | BODY MASS INDEX: 30 KG/M2 | TEMPERATURE: 98.3 F | SYSTOLIC BLOOD PRESSURE: 95 MMHG

## 2024-07-11 VITALS
RESPIRATION RATE: 18 BRPM | HEART RATE: 67 BPM | DIASTOLIC BLOOD PRESSURE: 56 MMHG | OXYGEN SATURATION: 100 % | SYSTOLIC BLOOD PRESSURE: 104 MMHG | TEMPERATURE: 98 F

## 2024-07-11 DIAGNOSIS — R51.9 ACUTE HEADACHE: Primary | ICD-10-CM

## 2024-07-11 DIAGNOSIS — R51.9 ACUTE NONINTRACTABLE HEADACHE, UNSPECIFIED HEADACHE TYPE: Primary | ICD-10-CM

## 2024-07-11 DIAGNOSIS — Z3A.14 14 WEEKS GESTATION OF PREGNANCY: ICD-10-CM

## 2024-07-11 LAB
ALBUMIN SERPL BCG-MCNC: 4.5 G/DL (ref 3.5–5)
ALP SERPL-CCNC: 55 U/L (ref 34–104)
ALT SERPL W P-5'-P-CCNC: 12 U/L (ref 7–52)
ANION GAP SERPL CALCULATED.3IONS-SCNC: 5 MMOL/L (ref 4–13)
APTT PPP: 29 SECONDS (ref 23–37)
AST SERPL W P-5'-P-CCNC: 18 U/L (ref 13–39)
BACTERIA UR QL AUTO: ABNORMAL /HPF
BASOPHILS # BLD AUTO: 0.01 THOUSANDS/ÂΜL (ref 0–0.1)
BASOPHILS NFR BLD AUTO: 0 % (ref 0–1)
BILIRUB SERPL-MCNC: 0.25 MG/DL (ref 0.2–1)
BILIRUB UR QL STRIP: NEGATIVE
BUN SERPL-MCNC: 3 MG/DL (ref 5–25)
CALCIUM SERPL-MCNC: 9.5 MG/DL (ref 8.4–10.2)
CHLORIDE SERPL-SCNC: 105 MMOL/L (ref 96–108)
CLARITY UR: CLEAR
CO2 SERPL-SCNC: 26 MMOL/L (ref 21–32)
COLOR UR: YELLOW
CREAT SERPL-MCNC: 0.5 MG/DL (ref 0.6–1.3)
EOSINOPHIL # BLD AUTO: 0.08 THOUSAND/ÂΜL (ref 0–0.61)
EOSINOPHIL NFR BLD AUTO: 1 % (ref 0–6)
ERYTHROCYTE [DISTWIDTH] IN BLOOD BY AUTOMATED COUNT: 13.1 % (ref 11.6–15.1)
GFR SERPL CREATININE-BSD FRML MDRD: 127 ML/MIN/1.73SQ M
GLUCOSE SERPL-MCNC: 77 MG/DL (ref 65–140)
GLUCOSE UR STRIP-MCNC: NEGATIVE MG/DL
HCT VFR BLD AUTO: 36.7 % (ref 34.8–46.1)
HGB BLD-MCNC: 12.2 G/DL (ref 11.5–15.4)
HGB UR QL STRIP.AUTO: ABNORMAL
IMM GRANULOCYTES # BLD AUTO: 0.02 THOUSAND/UL (ref 0–0.2)
IMM GRANULOCYTES NFR BLD AUTO: 0 % (ref 0–2)
INR PPP: 1.14 (ref 0.84–1.19)
KETONES UR STRIP-MCNC: NEGATIVE MG/DL
LEUKOCYTE ESTERASE UR QL STRIP: ABNORMAL
LYMPHOCYTES # BLD AUTO: 2.82 THOUSANDS/ÂΜL (ref 0.6–4.47)
LYMPHOCYTES NFR BLD AUTO: 29 % (ref 14–44)
MAGNESIUM SERPL-MCNC: 2.1 MG/DL (ref 1.9–2.7)
MCH RBC QN AUTO: 28.2 PG (ref 26.8–34.3)
MCHC RBC AUTO-ENTMCNC: 33.2 G/DL (ref 31.4–37.4)
MCV RBC AUTO: 85 FL (ref 82–98)
MONOCYTES # BLD AUTO: 0.48 THOUSAND/ÂΜL (ref 0.17–1.22)
MONOCYTES NFR BLD AUTO: 5 % (ref 4–12)
NEUTROPHILS # BLD AUTO: 6.18 THOUSANDS/ÂΜL (ref 1.85–7.62)
NEUTS SEG NFR BLD AUTO: 65 % (ref 43–75)
NITRITE UR QL STRIP: NEGATIVE
NON-SQ EPI CELLS URNS QL MICRO: ABNORMAL /HPF
NRBC BLD AUTO-RTO: 0 /100 WBCS
PH UR STRIP.AUTO: 6.5 [PH] (ref 4.5–8)
PLATELET # BLD AUTO: 230 THOUSANDS/UL (ref 149–390)
PMV BLD AUTO: 10.7 FL (ref 8.9–12.7)
POTASSIUM SERPL-SCNC: 3.6 MMOL/L (ref 3.5–5.3)
PROT SERPL-MCNC: 7.1 G/DL (ref 6.4–8.4)
PROT UR STRIP-MCNC: NEGATIVE MG/DL
PROTHROMBIN TIME: 14.8 SECONDS (ref 11.6–14.5)
RBC # BLD AUTO: 4.33 MILLION/UL (ref 3.81–5.12)
RBC #/AREA URNS AUTO: ABNORMAL /HPF
SODIUM SERPL-SCNC: 136 MMOL/L (ref 135–147)
SP GR UR STRIP.AUTO: 1.01 (ref 1–1.03)
UROBILINOGEN UR QL STRIP.AUTO: 0.2 E.U./DL
WBC # BLD AUTO: 9.59 THOUSAND/UL (ref 4.31–10.16)
WBC #/AREA URNS AUTO: ABNORMAL /HPF

## 2024-07-11 PROCEDURE — 99213 OFFICE O/P EST LOW 20 MIN: CPT | Performed by: NURSE PRACTITIONER

## 2024-07-11 PROCEDURE — 85610 PROTHROMBIN TIME: CPT | Performed by: PHYSICIAN ASSISTANT

## 2024-07-11 PROCEDURE — 80053 COMPREHEN METABOLIC PANEL: CPT | Performed by: PHYSICIAN ASSISTANT

## 2024-07-11 PROCEDURE — 85025 COMPLETE CBC W/AUTO DIFF WBC: CPT | Performed by: PHYSICIAN ASSISTANT

## 2024-07-11 PROCEDURE — 96375 TX/PRO/DX INJ NEW DRUG ADDON: CPT

## 2024-07-11 PROCEDURE — 96366 THER/PROPH/DIAG IV INF ADDON: CPT

## 2024-07-11 PROCEDURE — 81001 URINALYSIS AUTO W/SCOPE: CPT

## 2024-07-11 PROCEDURE — 96367 TX/PROPH/DG ADDL SEQ IV INF: CPT

## 2024-07-11 PROCEDURE — 96365 THER/PROPH/DIAG IV INF INIT: CPT

## 2024-07-11 PROCEDURE — 85730 THROMBOPLASTIN TIME PARTIAL: CPT | Performed by: PHYSICIAN ASSISTANT

## 2024-07-11 PROCEDURE — 87086 URINE CULTURE/COLONY COUNT: CPT

## 2024-07-11 PROCEDURE — 36415 COLL VENOUS BLD VENIPUNCTURE: CPT | Performed by: PHYSICIAN ASSISTANT

## 2024-07-11 PROCEDURE — 99284 EMERGENCY DEPT VISIT MOD MDM: CPT

## 2024-07-11 PROCEDURE — 83735 ASSAY OF MAGNESIUM: CPT | Performed by: PHYSICIAN ASSISTANT

## 2024-07-11 RX ORDER — ACETAMINOPHEN 10 MG/ML
1000 INJECTION, SOLUTION INTRAVENOUS ONCE
Status: COMPLETED | OUTPATIENT
Start: 2024-07-11 | End: 2024-07-11

## 2024-07-11 RX ORDER — MAGNESIUM SULFATE HEPTAHYDRATE 40 MG/ML
2 INJECTION, SOLUTION INTRAVENOUS ONCE
Status: COMPLETED | OUTPATIENT
Start: 2024-07-11 | End: 2024-07-11

## 2024-07-11 RX ORDER — METOCLOPRAMIDE 10 MG/1
10 TABLET ORAL EVERY 6 HOURS
Qty: 10 TABLET | Refills: 0 | Status: SHIPPED | OUTPATIENT
Start: 2024-07-11

## 2024-07-11 RX ORDER — METOCLOPRAMIDE HYDROCHLORIDE 5 MG/ML
10 INJECTION INTRAMUSCULAR; INTRAVENOUS ONCE
Status: COMPLETED | OUTPATIENT
Start: 2024-07-11 | End: 2024-07-11

## 2024-07-11 RX ORDER — SENNOSIDES 8.6 MG
650 CAPSULE ORAL EVERY 8 HOURS PRN
Qty: 30 TABLET | Refills: 0 | Status: SHIPPED | OUTPATIENT
Start: 2024-07-11

## 2024-07-11 RX ADMIN — SODIUM CHLORIDE 1000 ML: 0.9 INJECTION, SOLUTION INTRAVENOUS at 20:48

## 2024-07-11 RX ADMIN — ACETAMINOPHEN 1000 MG: 10 INJECTION INTRAVENOUS at 20:25

## 2024-07-11 RX ADMIN — METOCLOPRAMIDE 10 MG: 5 INJECTION, SOLUTION INTRAMUSCULAR; INTRAVENOUS at 20:20

## 2024-07-11 RX ADMIN — MAGNESIUM SULFATE HEPTAHYDRATE 2 G: 40 INJECTION, SOLUTION INTRAVENOUS at 20:47

## 2024-07-11 NOTE — PROGRESS NOTES
NAME: Johnathon English is a 34 y.o. female  : 1990    MRN: [id number]    /56   Pulse 67   Temp 98 °F (36.7 °C)   Resp 18   LMP 2024 (Exact Date)   SpO2 100%     5:57 PM    Assessment and Plan   Acute nonintractable headache, unspecified headache type [R51.9]  1. Acute nonintractable headache, unspecified headache type  Transfer to other facility          Johnathon was seen today for headache.    Diagnoses and all orders for this visit:    Acute nonintractable headache, unspecified headache type  -     Transfer to other facility        Patient Instructions     Patient Instructions   Pt going to the ER for further evaluation    Proceed to the nearest ER if symptoms worsen, Follow up with your PCP  Continue to social distance, wash your hands, and wear your masks. Please continue to follow the CDC.gov guidelines daily for they are subject to change on COVID-19    Chief Complaint     Chief Complaint   Patient presents with    Headache     Pain in head, pressure in head, 4 months pregnant, vomiting occurred today         History of Present Illness     34-year-old female here today with headache for the past 3 days.  Patient is here today with her  at bedside.  She is currently 4 months pregnant for the past few days the headache is around her whole head feel like a pressure rating into her neck.  She is taking Tylenol last this morning and nothing since.  She is unable to take NSAIDs due to being pregnant.  Patient felt dizzy earlier along with having blurred vision and reports that she had low blood pressure at home.  Patient blood pressure on arrival was 104/56.            Review of Systems   Review of Systems   Constitutional:  Negative for fatigue and fever.   Gastrointestinal:         4  mths pregnant, denies abdominal pain +nausea and + vomiting     Musculoskeletal: Negative.    Skin: Negative.    Neurological:  Positive for dizziness, weakness and headaches.    Psychiatric/Behavioral: Negative.           Current Medications       Current Outpatient Medications:     acetaminophen (TYLENOL) 500 mg tablet, Take 500 mg by mouth every 6 (six) hours as needed for mild pain, Disp: , Rfl:     cephalexin (KEFLEX) 500 mg capsule, , Disp: , Rfl:     Doxylamine-Pyridoxine 10-10 MG TBEC, Take 1 tablet by mouth in the morning, Disp: 30 tablet, Rfl: 2    Prenatal Vit-Fe Fumarate-FA (PRENATAL VITAMIN PO), Take by mouth, Disp: , Rfl:     triamcinolone (KENALOG) 0.1 % cream, , Disp: , Rfl:     Current Allergies     Allergies as of 07/11/2024    (No Known Allergies)              Past Medical History:   Diagnosis Date    History of chickenpox 02/2024       Past Surgical History:   Procedure Laterality Date    LIPOMA RESECTION  2023    located on her back       Family History   Problem Relation Age of Onset    Other Mother         vertigo    Venous thrombosis Father     Heart disease Maternal Grandmother     Heart disease Maternal Grandfather     No Known Problems Half-Sister     Scoliosis Half-Sister         mild    No Known Problems Half-Brother     Breast cancer Neg Hx     Colon cancer Neg Hx     Ovarian cancer Neg Hx          Medications have been verified.    The following portions of the patient's history were reviewed and updated as appropriate: allergies, current medications, past family history, past medical history, past social history, past surgical history and problem list.    Objective   /56   Pulse 67   Temp 98 °F (36.7 °C)   Resp 18   LMP 03/31/2024 (Exact Date)   SpO2 100%      Physical Exam     Physical Exam  Constitutional:       Appearance: Normal appearance. She is not ill-appearing.   HENT:      Head: Normocephalic.      Right Ear: Tympanic membrane and ear canal normal. There is no impacted cerumen.      Left Ear: Tympanic membrane, ear canal and external ear normal. There is no impacted cerumen.      Nose: Nose normal. No congestion or rhinorrhea.       "Mouth/Throat:      Pharynx: No oropharyngeal exudate.   Eyes:      Pupils: Pupils are equal, round, and reactive to light.   Cardiovascular:      Rate and Rhythm: Normal rate and regular rhythm.   Pulmonary:      Effort: Pulmonary effort is normal.   Musculoskeletal:         General: Normal range of motion.   Skin:     General: Skin is warm.      Capillary Refill: Capillary refill takes less than 2 seconds.      Coloration: Skin is not jaundiced or pale.      Findings: No bruising, erythema, lesion or rash.   Neurological:      General: No focal deficit present.      Mental Status: She is alert and oriented to person, place, and time.   Psychiatric:         Mood and Affect: Mood normal.               Note: Portions of this record may have been created with voice recognition software. Occasional wrong word or \"sound a like\" substitutions may have occurred due to the inherent limitations of voice recognition software. Please read the chart carefully and recognize, using context, where substitutions have occurred.*    ROCKY Pearson  "

## 2024-07-12 LAB — BACTERIA UR CULT: NORMAL

## 2024-07-12 NOTE — ED PROVIDER NOTES
History  Chief Complaint   Patient presents with    Headache     Pt reports she is 14 weeks pregnant. Pt reports for the past 3 days headaches and low blood pressure. Denies any dizziness. Taking tylenol with no relief.      Patient is 14 weeks pregnant presents emergency department with headache no vomiting.  History of migraines feels similar to her prior migraines has not had a migraine in 3 years.  Had an episode of some blurry vision this morning but it resolved and she has not had any dizziness or blurry vision since.  She has been taking Tylenol for headaches  No bleeding or leakage of fluids.        Prior to Admission Medications   Prescriptions Last Dose Informant Patient Reported? Taking?   Doxylamine-Pyridoxine 10-10 MG TBEC   No No   Sig: Take 1 tablet by mouth in the morning   Prenatal Vit-Fe Fumarate-FA (PRENATAL VITAMIN PO)   Yes No   Sig: Take by mouth   cephalexin (KEFLEX) 500 mg capsule   Yes No   Patient not taking: Reported on 6/12/2024   triamcinolone (KENALOG) 0.1 % cream   Yes No   Patient not taking: Reported on 6/12/2024      Facility-Administered Medications: None       Past Medical History:   Diagnosis Date    History of chickenpox 02/2024       Past Surgical History:   Procedure Laterality Date    LIPOMA RESECTION  2023    located on her back       Family History   Problem Relation Age of Onset    Other Mother         vertigo    Venous thrombosis Father     Heart disease Maternal Grandmother     Heart disease Maternal Grandfather     No Known Problems Half-Sister     Scoliosis Half-Sister         mild    No Known Problems Half-Brother     Breast cancer Neg Hx     Colon cancer Neg Hx     Ovarian cancer Neg Hx      I have reviewed and agree with the history as documented.    E-Cigarette/Vaping    E-Cigarette Use Never User      E-Cigarette/Vaping Substances    Nicotine No     THC No     CBD No     Flavoring No     Other No     Unknown No      Social History     Tobacco Use    Smoking  status: Never    Smokeless tobacco: Never   Vaping Use    Vaping status: Never Used   Substance Use Topics    Alcohol use: Not Currently    Drug use: Never       Review of Systems   Constitutional:  Negative for fever.   HENT: Negative.     Respiratory: Negative.     Cardiovascular: Negative.    Gastrointestinal:  Positive for nausea. Negative for vomiting.   Genitourinary:  Negative for difficulty urinating, dyspareunia, flank pain, vaginal bleeding and vaginal discharge.   Neurological:  Positive for headaches.   All other systems reviewed and are negative.      Physical Exam  Physical Exam  Vitals and nursing note reviewed.   Constitutional:       Appearance: She is well-developed.   HENT:      Head: Normocephalic and atraumatic.      Right Ear: Tympanic membrane and external ear normal.      Left Ear: Tympanic membrane and external ear normal.   Eyes:      Conjunctiva/sclera: Conjunctivae normal.   Cardiovascular:      Rate and Rhythm: Normal rate and regular rhythm.      Heart sounds: Normal heart sounds.   Pulmonary:      Effort: Pulmonary effort is normal.      Breath sounds: Normal breath sounds.   Abdominal:      General: Bowel sounds are normal.      Palpations: Abdomen is soft.      Tenderness: There is no abdominal tenderness.      Comments: Gravid uterus   Musculoskeletal:         General: Normal range of motion.      Cervical back: Neck supple.   Lymphadenopathy:      Cervical: No cervical adenopathy.   Skin:     General: Skin is warm.      Findings: No rash.   Neurological:      Mental Status: She is alert.      Cranial Nerves: No cranial nerve deficit or facial asymmetry.      Sensory: Sensation is intact.      Motor: Motor function is intact. No weakness.      Coordination: Coordination is intact. Romberg sign negative. Finger-Nose-Finger Test normal.      Gait: Gait is intact.   Psychiatric:         Behavior: Behavior normal.         Vital Signs  ED Triage Vitals [07/11/24 1859]   Temperature Pulse  Respirations Blood Pressure SpO2   98.3 °F (36.8 °C) 72 17 96/53 100 %      Temp Source Heart Rate Source Patient Position - Orthostatic VS BP Location FiO2 (%)   Oral Monitor Sitting Right arm --      Pain Score       --           Vitals:    07/11/24 1859 07/11/24 2026 07/11/24 2100 07/11/24 2200   BP: 96/53 97/56 101/57 95/53   Pulse: 72 73 67 67   Patient Position - Orthostatic VS: Sitting            Visual Acuity      ED Medications  Medications   sodium chloride 0.9 % bolus 1,000 mL (0 mL Intravenous Stopped 7/11/24 2305)   acetaminophen (Ofirmev) injection 1,000 mg (0 mg Intravenous Stopped 7/11/24 2043)   metoclopramide (REGLAN) injection 10 mg (10 mg Intravenous Given 7/11/24 2020)   magnesium sulfate 2 g/50 mL IVPB (premix) 2 g (0 g Intravenous Stopped 7/11/24 2235)       Diagnostic Studies  Results Reviewed       Procedure Component Value Units Date/Time    Urine Microscopic [[id number]]  (Abnormal) Collected: 07/11/24 2028    Lab Status: Final result Specimen: Urine, Clean Catch Updated: 07/11/24 2113     RBC, UA 1-2 /hpf      WBC, UA 4-10 /hpf      Epithelial Cells Occasional /hpf      Bacteria, UA Occasional /hpf     Urine culture [[id number]] Collected: 07/11/24 2028    Lab Status: In process Specimen: Urine, Clean Catch Updated: 07/11/24 2055    Comprehensive metabolic panel [[id number]]  (Abnormal) Collected: 07/11/24 2019    Lab Status: Final result Specimen: Blood from Arm, Left Updated: 07/11/24 2045     Sodium 136 mmol/L      Potassium 3.6 mmol/L      Chloride 105 mmol/L      CO2 26 mmol/L      ANION GAP 5 mmol/L      BUN 3 mg/dL      Creatinine 0.50 mg/dL      Glucose 77 mg/dL      Calcium 9.5 mg/dL      AST 18 U/L      ALT 12 U/L      Alkaline Phosphatase 55 U/L      Total Protein 7.1 g/dL      Albumin 4.5 g/dL      Total Bilirubin 0.25 mg/dL      eGFR 127 ml/min/1.73sq m     Narrative:      National Kidney Disease Foundation guidelines for Chronic Kidney Disease (CKD):     Stage 1 with normal or  high GFR (GFR > 90 mL/min/1.73 square meters)    Stage 2 Mild CKD (GFR = 60-89 mL/min/1.73 square meters)    Stage 3A Moderate CKD (GFR = 45-59 mL/min/1.73 square meters)    Stage 3B Moderate CKD (GFR = 30-44 mL/min/1.73 square meters)    Stage 4 Severe CKD (GFR = 15-29 mL/min/1.73 square meters)    Stage 5 End Stage CKD (GFR <15 mL/min/1.73 square meters)  Note: GFR calculation is accurate only with a steady state creatinine    Magnesium [[id number]]  (Normal) Collected: 07/11/24 2019    Lab Status: Final result Specimen: Blood from Arm, Left Updated: 07/11/24 2045     Magnesium 2.1 mg/dL     Protime-INR [[id number]]  (Abnormal) Collected: 07/11/24 2019    Lab Status: Final result Specimen: Blood from Arm, Left Updated: 07/11/24 2044     Protime 14.8 seconds      INR 1.14    APTT [[id number]]  (Normal) Collected: 07/11/24 2019    Lab Status: Final result Specimen: Blood from Arm, Left Updated: 07/11/24 2044     PTT 29 seconds     CBC and differential [[id number]] Collected: 07/11/24 2019    Lab Status: Final result Specimen: Blood from Arm, Left Updated: 07/11/24 2033     WBC 9.59 Thousand/uL      RBC 4.33 Million/uL      Hemoglobin 12.2 g/dL      Hematocrit 36.7 %      MCV 85 fL      MCH 28.2 pg      MCHC 33.2 g/dL      RDW 13.1 %      MPV 10.7 fL      Platelets 230 Thousands/uL      nRBC 0 /100 WBCs      Segmented % 65 %      Immature Grans % 0 %      Lymphocytes % 29 %      Monocytes % 5 %      Eosinophils Relative 1 %      Basophils Relative 0 %      Absolute Neutrophils 6.18 Thousands/µL      Absolute Immature Grans 0.02 Thousand/uL      Absolute Lymphocytes 2.82 Thousands/µL      Absolute Monocytes 0.48 Thousand/µL      Eosinophils Absolute 0.08 Thousand/µL      Basophils Absolute 0.01 Thousands/µL     Urine Macroscopic, POC [[id number]]  (Abnormal) Collected: 07/11/24 2028    Lab Status: Final result Specimen: Urine Updated: 07/11/24 2030     Color, UA Yellow     Clarity, UA Clear     pH, UA 6.5     Leukocytes,  UA Large     Nitrite, UA Negative     Protein, UA Negative mg/dl      Glucose, UA Negative mg/dl      Ketones, UA Negative mg/dl      Urobilinogen, UA 0.2 E.U./dl      Bilirubin, UA Negative     Occult Blood, UA Trace     Specific Gravity, UA 1.015    Narrative:      CLINITEK RESULT                   No orders to display              Procedures  Procedures         ED Course  ED Course as of 07/12/24 0223   Thu Jul 11, 2024 2037 WBC: 9.59  normal   2128 Discussed with OB - DR Fidelia Parker - discussed results and plan                                  SBIRT 22yo+      Flowsheet Row Most Recent Value   Initial Alcohol Screen: US AUDIT-C     1. How often do you have a drink containing alcohol? 0 Filed at: 07/11/2024 1900   2. How many drinks containing alcohol do you have on a typical day you are drinking?  0 Filed at: 07/11/2024 1900   3b. FEMALE Any Age, or MALE 65+: How often do you have 4 or more drinks on one occassion? 0 Filed at: 07/11/2024 1900   Audit-C Score 0 Filed at: 07/11/2024 1900   FILEMON: How many times in the past year have you...    Used an illegal drug or used a prescription medication for non-medical reasons? Never Filed at: 07/11/2024 1900                      Medical Decision Making  Discussed with DR Duffy and OBGYN  Will medicate   Bed side US done by myself - good fetal movement and heart tones.  Labs for electrolyte abnormalities or anemia and wbc   Sig improvement in symptoms -with meds, hx migraine in past -     Amount and/or Complexity of Data Reviewed  Independent Historian: spouse  External Data Reviewed: notes.  Labs: ordered. Decision-making details documented in ED Course.  Discussion of management or test interpretation with external provider(s): DR Duffy and OB    Risk  OTC drugs.  Prescription drug management.                 Disposition  Final diagnoses:   Acute headache   14 weeks gestation of pregnancy     Time reflects when diagnosis was documented in both MDM as  applicable and the Disposition within this note       Time User Action Codes Description Comment    7/11/2024  9:38 PM Pamela Morocho [R51.9] Acute headache     7/11/2024  9:38 PM Pamela Morocho [Z3A.14] 14 weeks gestation of pregnancy           ED Disposition       ED Disposition   Discharge    Condition   Stable    Date/Time   Thu Jul 11, 2024 2138    Comment   Johnathon English discharge to home/self care.                   Follow-up Information       Follow up With Specialties Details Why Contact Info    Bryon Rae MD Family Medicine   [address] [place] [address].  [address]  [place] [address]  [phone]              Discharge Medication List as of 7/11/2024  9:40 PM        START taking these medications    Details   acetaminophen (TYLENOL) 650 mg CR tablet Take 1 tablet (650 mg total) by mouth every 8 (eight) hours as needed for mild pain, Starting Thu 7/11/2024, Normal      metoclopramide (Reglan) 10 mg tablet Take 1 tablet (10 mg total) by mouth every 6 (six) hours PRN nausea/HA, Starting Thu 7/11/2024, Normal           CONTINUE these medications which have NOT CHANGED    Details   cephalexin (KEFLEX) 500 mg capsule Historical Med      Doxylamine-Pyridoxine 10-10 MG TBEC Take 1 tablet by mouth in the morning, Starting Thu 5/30/2024, Until Sat 6/29/2024, Normal      Prenatal Vit-Fe Fumarate-FA (PRENATAL VITAMIN PO) Take by mouth, Historical Med      triamcinolone (KENALOG) 0.1 % cream Historical Med             No discharge procedures on file.    PDMP Review       None            ED Provider  Electronically Signed by             Pamela Morocho PA-C  07/12/24 0228

## 2024-08-13 ENCOUNTER — PATIENT MESSAGE (OUTPATIENT)
Dept: OBGYN CLINIC | Facility: MEDICAL CENTER | Age: 34
End: 2024-08-13

## 2024-08-20 ENCOUNTER — ROUTINE PRENATAL (OUTPATIENT)
Dept: PERINATAL CARE | Facility: CLINIC | Age: 34
End: 2024-08-20

## 2024-08-20 VITALS
DIASTOLIC BLOOD PRESSURE: 64 MMHG | HEART RATE: 99 BPM | BODY MASS INDEX: 30.9 KG/M2 | HEIGHT: 58 IN | SYSTOLIC BLOOD PRESSURE: 96 MMHG | WEIGHT: 147.2 LBS | OXYGEN SATURATION: 98 %

## 2024-08-20 DIAGNOSIS — Z3A.20 20 WEEKS GESTATION OF PREGNANCY: Primary | ICD-10-CM

## 2024-08-20 DIAGNOSIS — Z36.86 ENCOUNTER FOR ANTENATAL SCREENING FOR CERVICAL LENGTH: ICD-10-CM

## 2024-08-20 DIAGNOSIS — O99.210 OBESITY AFFECTING PREGNANCY, ANTEPARTUM, UNSPECIFIED OBESITY TYPE: ICD-10-CM

## 2024-08-20 PROCEDURE — 76811 OB US DETAILED SNGL FETUS: CPT | Performed by: OBSTETRICS & GYNECOLOGY

## 2024-08-20 PROCEDURE — 76817 TRANSVAGINAL US OBSTETRIC: CPT | Performed by: OBSTETRICS & GYNECOLOGY

## 2024-08-20 PROCEDURE — 99213 OFFICE O/P EST LOW 20 MIN: CPT | Performed by: OBSTETRICS & GYNECOLOGY

## 2024-08-20 NOTE — PROGRESS NOTES
The patient was seen today for an ultrasound.  Please see ultrasound report (located under Ob Procedures) for additional details.   Thank you very much for allowing us to participate in the care of this very nice patient.  Should you have any questions, please do not hesitate to contact me.     Chintan Kelley MD FACOG  Attending Physician, Maternal-Fetal Medicine  [facility]

## 2024-08-20 NOTE — PROGRESS NOTES
Ultrasound Probe Disinfection    A transvaginal ultrasound was performed.   Prior to use, disinfection was performed with High Level Disinfection Process ([employer] [place]).  Probe serial number B2: [id number] was used.    Leonora Cisneros  08/20/24  10:16 AM

## 2024-08-21 PROBLEM — Z3A.13 13 WEEKS GESTATION OF PREGNANCY: Status: RESOLVED | Noted: 2024-07-08 | Resolved: 2024-08-21

## 2024-08-21 PROBLEM — O99.810 ABNORMAL GLUCOSE TOLERANCE TEST (GTT) DURING PREGNANCY, ANTEPARTUM: Status: ACTIVE | Noted: 2024-08-21

## 2024-08-21 PROBLEM — O99.210 OBESITY IN PREGNANCY, ANTEPARTUM: Status: ACTIVE | Noted: 2024-08-21

## 2024-08-23 ENCOUNTER — TELEPHONE (OUTPATIENT)
Age: 34
End: 2024-08-23

## 2024-08-26 ENCOUNTER — TELEPHONE (OUTPATIENT)
Dept: OBGYN CLINIC | Facility: MEDICAL CENTER | Age: 34
End: 2024-08-26

## 2024-08-26 DIAGNOSIS — Z3A.21 21 WEEKS GESTATION OF PREGNANCY: Primary | ICD-10-CM

## 2024-08-26 NOTE — TELEPHONE ENCOUNTER
2ND TRIMESTER CHECK-IN CALL      Overall how are you doing? Patient reports to be doing well. Patient is apologetic she has missed a few ob visits. RN was able to schedule patient for next Friday 9/6.     Compliant with routine OB care appointments? No. Patient has not been seen since June. Patient reports has been working a lot and very apologetic she has missed a few visits. Educated on the importance of compliance with prenatal care.      Have you completed your 1st trimester labs? Yes. Elevated 1 hour GTT. Patient aware 3 hour needs to be completed.     If you had NIPS with MFM, do you have a order for MSAFP? Order placed. Patient aware needs to be completed within the next few days.     Have you seen MFM and do you have your detailed US scheduled? Patient had level ll 8/20     Pregnancy Education-have you had a chance to review the classes/tour offered and registered? Registration information reviewed. [employer] message sent with link for patient to register.     EPDS Score: To be submitted via [employer]

## 2024-08-26 NOTE — TELEPHONE ENCOUNTER
I called pt to schedule her appointment unavailable to leave a Vm is full, we have some opening for today.

## 2024-09-02 ENCOUNTER — PATIENT MESSAGE (OUTPATIENT)
Dept: OBGYN CLINIC | Facility: MEDICAL CENTER | Age: 34
End: 2024-09-02

## 2024-09-06 ENCOUNTER — ROUTINE PRENATAL (OUTPATIENT)
Dept: OBGYN CLINIC | Facility: CLINIC | Age: 34
End: 2024-09-06

## 2024-09-06 VITALS — BODY MASS INDEX: 31.14 KG/M2 | DIASTOLIC BLOOD PRESSURE: 66 MMHG | SYSTOLIC BLOOD PRESSURE: 114 MMHG | WEIGHT: 149 LBS

## 2024-09-06 DIAGNOSIS — O99.810 ABNORMAL GLUCOSE TOLERANCE TEST (GTT) DURING PREGNANCY, ANTEPARTUM: ICD-10-CM

## 2024-09-06 DIAGNOSIS — O09.32 INSUFFICIENT PRENATAL CARE IN SECOND TRIMESTER: ICD-10-CM

## 2024-09-06 DIAGNOSIS — Z3A.22 22 WEEKS GESTATION OF PREGNANCY: ICD-10-CM

## 2024-09-06 DIAGNOSIS — O99.210 OBESITY IN PREGNANCY, ANTEPARTUM: Primary | ICD-10-CM

## 2024-09-06 PROCEDURE — PNV: Performed by: NURSE PRACTITIONER

## 2024-09-06 NOTE — PROGRESS NOTES
Patient is primarily Serbian-speaking.   used as primary  per patient request.  Has not been seen in office since  12w 5d. denies loss of fluid, vaginal bleeding or abdominal pain.  Confirms fetal movement, flutters.  Tolerating prenatal vitamin well.  Is using Reglan as needed.  Is complaining of right breast itching and irritated intermittently.  Has not completed 3-hour glucose.  MSAFP was canceled due to out of gestational age range  BP: 114/66 weight:+11lbs  Plan  -Continue prenatal vitamin daily  -Follow-up with  center scheduled 24  -Encouraged to complete 3-hour glucose  -Is traveling to [place] next week.  Reviewed increasing hydration, walking frequently and knowing where the closest hospital that offers OB care is.  Reviewed with patient can access documents/results through [employer]  -Right breast irritation reviewed can try hydrocortisone as needed  -Common discomforts of pregnancy and precautions including  labor reviewed.  Signs and symptoms to report reviewed.  RTO 4 weeks f/u GTT

## 2024-09-09 DIAGNOSIS — O21.9 NAUSEA AND VOMITING IN PREGNANCY: ICD-10-CM

## 2024-09-09 DIAGNOSIS — Z3A.23 23 WEEKS GESTATION OF PREGNANCY: Primary | ICD-10-CM

## 2024-09-16 ENCOUNTER — TELEPHONE (OUTPATIENT)
Age: 34
End: 2024-09-16

## 2024-09-16 NOTE — TELEPHONE ENCOUNTER
Pt's spouse (Edmund) called to R/S OB appt on 9/20. No available appts until mid Oct. Please provide a call back to assist with R/S appt. Edmund is on pt's communication consent form.    *Pt's spouse stated any time this week, or morning of 9/20.

## 2024-09-20 ENCOUNTER — ROUTINE PRENATAL (OUTPATIENT)
Dept: OBGYN CLINIC | Facility: CLINIC | Age: 34
End: 2024-09-20

## 2024-09-20 VITALS — BODY MASS INDEX: 31.02 KG/M2 | SYSTOLIC BLOOD PRESSURE: 100 MMHG | DIASTOLIC BLOOD PRESSURE: 60 MMHG | WEIGHT: 148.4 LBS

## 2024-09-20 DIAGNOSIS — Z34.92 SECOND TRIMESTER PREGNANCY: Primary | ICD-10-CM

## 2024-09-20 DIAGNOSIS — Z3A.24 24 WEEKS GESTATION OF PREGNANCY: ICD-10-CM

## 2024-09-20 PROCEDURE — PNV: Performed by: OBSTETRICS & GYNECOLOGY

## 2024-09-20 NOTE — PROGRESS NOTES
Routine Prenatal Visit  [facility]  [address], [address], [place], [place]    Assessment/Plan:  Johnathon is a 34 y.o. year old  at 24w5d who presents for routine prenatal visit.     1. Second trimester pregnancy  -     Anemia Panel w/Reflex, OB; Future  -     RPR-Syphilis Screening (Total Syphilis IGG/IGM); Future  -     CBC and differential; Future  -     Glucose, 1H PG; Future        Subjective:     CC: Prenatal care    Johnathon English is a 34 y.o.  female who presents for routine prenatal care at 24w5d.  Pregnancy ROS: no leakage of fluid, pelvic pain, or vaginal bleeding.  + fetal movement.  Reports sore through, believes its allergies; discussed antihistamines.   [place] be travelling to [place]. Discussed increased hydration    The following portions of the patient's history were reviewed and updated as appropriate: allergies, current medications, past family history, past medical history, obstetric history, gynecologic history, past social history, past surgical history and problem list.      Objective:  /60   Wt 67.3 kg (148 lb 6.4 oz)   LMP 2024 (Exact Date)   BMI 31.02 kg/m²   Pregravid Weight/BMI: 62.6 kg (138 lb) (BMI 28.85)  Current Weight: 67.3 kg (148 lb 6.4 oz)   Total Weight Gain: 4.717 kg (10 lb 6.4 oz)   Pre- Vitals      Flowsheet Row Most Recent Value   Prenatal Assessment    Fetal Heart Rate 150   Movement Present   Prenatal Vitals    Blood Pressure 100/60   Weight - Scale 67.3 kg (148 lb 6.4 oz)   Urine Albumin/Glucose    Dilation/Effacement/Station    Vaginal Drainage    Draining Fluid No   Edema    LLE Edema Trace   RLE Edema None   Facial Edema Trace             General: Well appearing, no distress  Respiratory: Unlabored breathing  Cardiovascular: Regular rate.  Abdomen: Soft, gravid, nontender  Fundal Height: Appropriate for gestational age.  Extremities: Warm and well perfused.  Non tender.

## 2024-10-18 ENCOUNTER — APPOINTMENT (OUTPATIENT)
Dept: LAB | Facility: MEDICAL CENTER | Age: 34
End: 2024-10-18
Payer: COMMERCIAL

## 2024-10-18 ENCOUNTER — ROUTINE PRENATAL (OUTPATIENT)
Dept: OBGYN CLINIC | Facility: MEDICAL CENTER | Age: 34
End: 2024-10-18
Payer: COMMERCIAL

## 2024-10-18 VITALS — WEIGHT: 155.2 LBS | DIASTOLIC BLOOD PRESSURE: 70 MMHG | BODY MASS INDEX: 32.44 KG/M2 | SYSTOLIC BLOOD PRESSURE: 120 MMHG

## 2024-10-18 DIAGNOSIS — O99.810 ABNORMAL GLUCOSE TOLERANCE TEST (GTT) DURING PREGNANCY, ANTEPARTUM: ICD-10-CM

## 2024-10-18 DIAGNOSIS — Z34.92 SECOND TRIMESTER PREGNANCY: ICD-10-CM

## 2024-10-18 DIAGNOSIS — Z3A.28 28 WEEKS GESTATION OF PREGNANCY: Primary | ICD-10-CM

## 2024-10-18 DIAGNOSIS — Z23 ENCOUNTER FOR IMMUNIZATION: ICD-10-CM

## 2024-10-18 DIAGNOSIS — O09.32 INSUFFICIENT PRENATAL CARE IN SECOND TRIMESTER: ICD-10-CM

## 2024-10-18 DIAGNOSIS — E66.3 OVERWEIGHT: ICD-10-CM

## 2024-10-18 LAB
BASOPHILS # BLD AUTO: 0.02 THOUSANDS/ΜL (ref 0–0.1)
BASOPHILS NFR BLD AUTO: 0 % (ref 0–1)
DME PARACHUTE DELIVERY DATE REQUESTED: NORMAL
DME PARACHUTE ITEM DESCRIPTION: NORMAL
DME PARACHUTE ORDER STATUS: NORMAL
DME PARACHUTE SUPPLIER NAME: NORMAL
DME PARACHUTE SUPPLIER PHONE: NORMAL
EOSINOPHIL # BLD AUTO: 0.06 THOUSAND/ΜL (ref 0–0.61)
EOSINOPHIL NFR BLD AUTO: 1 % (ref 0–6)
ERYTHROCYTE [DISTWIDTH] IN BLOOD BY AUTOMATED COUNT: 13.2 % (ref 11.6–15.1)
GLUCOSE 1H P 50 G GLC PO SERPL-MCNC: 168 MG/DL (ref 70–134)
HCT VFR BLD AUTO: 36.3 % (ref 34.8–46.1)
HGB BLD-MCNC: 11.7 G/DL (ref 11.5–15.4)
IMM GRANULOCYTES # BLD AUTO: 0.07 THOUSAND/UL (ref 0–0.2)
IMM GRANULOCYTES NFR BLD AUTO: 1 % (ref 0–2)
LYMPHOCYTES # BLD AUTO: 1.89 THOUSANDS/ΜL (ref 0.6–4.47)
LYMPHOCYTES NFR BLD AUTO: 21 % (ref 14–44)
MCH RBC QN AUTO: 28.7 PG (ref 26.8–34.3)
MCHC RBC AUTO-ENTMCNC: 32.2 G/DL (ref 31.4–37.4)
MCV RBC AUTO: 89 FL (ref 82–98)
MONOCYTES # BLD AUTO: 0.41 THOUSAND/ΜL (ref 0.17–1.22)
MONOCYTES NFR BLD AUTO: 5 % (ref 4–12)
NEUTROPHILS # BLD AUTO: 6.73 THOUSANDS/ΜL (ref 1.85–7.62)
NEUTS SEG NFR BLD AUTO: 72 % (ref 43–75)
NRBC BLD AUTO-RTO: 0 /100 WBCS
PLATELET # BLD AUTO: 223 THOUSANDS/UL (ref 149–390)
PMV BLD AUTO: 11.2 FL (ref 8.9–12.7)
RBC # BLD AUTO: 4.08 MILLION/UL (ref 3.81–5.12)
WBC # BLD AUTO: 9.18 THOUSAND/UL (ref 4.31–10.16)

## 2024-10-18 PROCEDURE — 90471 IMMUNIZATION ADMIN: CPT | Performed by: OBSTETRICS & GYNECOLOGY

## 2024-10-18 PROCEDURE — 82950 GLUCOSE TEST: CPT

## 2024-10-18 PROCEDURE — 36415 COLL VENOUS BLD VENIPUNCTURE: CPT

## 2024-10-18 PROCEDURE — 85025 COMPLETE CBC W/AUTO DIFF WBC: CPT

## 2024-10-18 PROCEDURE — 86780 TREPONEMA PALLIDUM: CPT

## 2024-10-18 PROCEDURE — PNV: Performed by: OBSTETRICS & GYNECOLOGY

## 2024-10-18 PROCEDURE — 90715 TDAP VACCINE 7 YRS/> IM: CPT | Performed by: OBSTETRICS & GYNECOLOGY

## 2024-10-18 NOTE — PROGRESS NOTES
Routine Prenatal Visit  [facility] [address], [place], [place]      OB/GYN Prenatal Visit    ASSESSMENT / PLAN:  1. 28 weeks gestation of pregnancy  Assessment & Plan:  Limited care   2. Insufficient prenatal care in second trimester  Assessment & Plan:  Asked pt the obstacle   to care and how can we make it better for her to get here    3. Abnormal glucose tolerance test (GTT) during pregnancy, antepartum  Assessment & Plan:  1 hour testing was 137 - early   Did not do the 3 hour testing recommend to do asap   Along with the other 28 week labs.   4. Overweight  5. Encounter for immunization  -     Tdap Vaccine greater than or equal to 8yo        SUBJECTIVE:  Johnathon English is a 34 y.o.  at 28 here for prenatal visit.  She has no obstetric complaints and denies pelvic pain, cramping/contractions, vaginal bleeding, loss of fluid, or decreased fetal movement.     Complaints -    Vaginal irritation - yeast infection   Swelling - she states was in the dr better now  Blurry vision one time for 30 min - /70 resolved        The following portions of the patient's history were reviewed and updated as appropriate: allergies, current medications, past family history, past medical history, obstetric history, gynecologic history, past social history, past surgical history and problem list.      Objective:  /70   Wt 70.4 kg (155 lb 3.2 oz)   LMP 2024 (Exact Date)   BMI 32.44 kg/m²   Pregravid Weight/BMI: 62.6 kg (138 lb) (BMI 28.85)  Current Weight: 70.4 kg (155 lb 3.2 oz)   Total Weight Gain: 7.802 kg (17 lb 3.2 oz)   Pre-Amelia Vitals      Flowsheet Row Most Recent Value   Prenatal Assessment    Fetal Heart Rate 150   Movement Present   Prenatal Vitals    Blood Pressure 120/70   Weight - Scale 70.4 kg (155 lb 3.2 oz)   Urine Albumin/Glucose    Dilation/Effacement/Station    Vaginal Drainage    Draining Fluid No   Edema    LLE Edema Trace   RLE Edema Trace                Physical Exam:    General: Well appearing, no distress  Respiratory: Unlabored breathing  Cardiovascular: Regular rate.  Abdomen: Soft, gravid, nontender  Fundal Height: Appropriate for gestational age.  Extremities: Warm and well perfused.  Non tender.      German Watt MD

## 2024-10-18 NOTE — ASSESSMENT & PLAN NOTE
1 hour testing was 137 - early   Did not do the 3 hour testing recommend to do asap   Along with the other 28 week labs.

## 2024-10-19 LAB — TREPONEMA PALLIDUM IGG+IGM AB [PRESENCE] IN SERUM OR PLASMA BY IMMUNOASSAY: NORMAL

## 2024-10-21 DIAGNOSIS — R73.09 ELEVATED GLUCOSE TOLERANCE TEST: Primary | ICD-10-CM

## 2024-11-01 ENCOUNTER — ROUTINE PRENATAL (OUTPATIENT)
Age: 34
End: 2024-11-01

## 2024-11-01 ENCOUNTER — LAB (OUTPATIENT)
Dept: LAB | Facility: HOSPITAL | Age: 34
End: 2024-11-01
Payer: COMMERCIAL

## 2024-11-01 ENCOUNTER — APPOINTMENT (OUTPATIENT)
Dept: LAB | Facility: HOSPITAL | Age: 34
End: 2024-11-01
Payer: COMMERCIAL

## 2024-11-01 VITALS — WEIGHT: 157 LBS | DIASTOLIC BLOOD PRESSURE: 60 MMHG | BODY MASS INDEX: 32.81 KG/M2 | SYSTOLIC BLOOD PRESSURE: 114 MMHG

## 2024-11-01 DIAGNOSIS — Z3A.30 30 WEEKS GESTATION OF PREGNANCY: ICD-10-CM

## 2024-11-01 DIAGNOSIS — R73.09 ELEVATED GLUCOSE TOLERANCE TEST: ICD-10-CM

## 2024-11-01 DIAGNOSIS — O99.810 ABNORMAL GLUCOSE TOLERANCE TEST (GTT) DURING PREGNANCY, ANTEPARTUM: ICD-10-CM

## 2024-11-01 DIAGNOSIS — R19.7 DIARRHEA, UNSPECIFIED TYPE: Primary | ICD-10-CM

## 2024-11-01 DIAGNOSIS — Z3A.21 21 WEEKS GESTATION OF PREGNANCY: ICD-10-CM

## 2024-11-01 DIAGNOSIS — R19.7 DIARRHEA OF PRESUMED INFECTIOUS ORIGIN: Primary | ICD-10-CM

## 2024-11-01 DIAGNOSIS — R19.7 DIARRHEA OF PRESUMED INFECTIOUS ORIGIN: ICD-10-CM

## 2024-11-01 DIAGNOSIS — N89.8 VAGINAL ITCHING: ICD-10-CM

## 2024-11-01 LAB
GLUCOSE 1H P 100 G GLC PO SERPL-MCNC: 177 MG/DL (ref 65–179)
GLUCOSE 2H P 100 G GLC PO SERPL-MCNC: 158 MG/DL (ref 65–154)
GLUCOSE 3H P 100 G GLC PO SERPL-MCNC: 125 MG/DL (ref 65–139)
GLUCOSE P FAST SERPL-MCNC: 85 MG/DL (ref 65–94)

## 2024-11-01 PROCEDURE — 87209 SMEAR COMPLEX STAIN: CPT

## 2024-11-01 PROCEDURE — 87510 GARDNER VAG DNA DIR PROBE: CPT | Performed by: OBSTETRICS & GYNECOLOGY

## 2024-11-01 PROCEDURE — 87660 TRICHOMONAS VAGIN DIR PROBE: CPT | Performed by: OBSTETRICS & GYNECOLOGY

## 2024-11-01 PROCEDURE — 87177 OVA AND PARASITES SMEARS: CPT

## 2024-11-01 PROCEDURE — 82952 GTT-ADDED SAMPLES: CPT

## 2024-11-01 PROCEDURE — PNV: Performed by: OBSTETRICS & GYNECOLOGY

## 2024-11-01 PROCEDURE — 82951 GLUCOSE TOLERANCE TEST (GTT): CPT

## 2024-11-01 PROCEDURE — 87480 CANDIDA DNA DIR PROBE: CPT | Performed by: OBSTETRICS & GYNECOLOGY

## 2024-11-01 PROCEDURE — 36415 COLL VENOUS BLD VENIPUNCTURE: CPT

## 2024-11-01 NOTE — PROGRESS NOTES
Johnathon is a 34 y.o. year old  at 30w5d for routine prenatal visit.   + FM, no vaginal bleeding, contractions, or LOF  Complaints: Yes - vulvar and vaginal itching present since at leas last visit as well as daily diarrhea present for  over 2 weeks / no fevers, chills or  sick contacts - desires stool culture for parasites  . States has never suffered from this as she usually is constipated , we did discuss changes  on BM  that can occur in  pregnancy  Most recent ultrasound and labs reviewed.  [facility] reviewed   Has US at [facility] scheduled to complete anatomy scan   States will complete 3 hr gtt today

## 2024-11-02 LAB
CANDIDA RRNA VAG QL PROBE: DETECTED
G VAGINALIS RRNA GENITAL QL PROBE: DETECTED
T VAGINALIS RRNA GENITAL QL PROBE: NOT DETECTED

## 2024-11-05 ENCOUNTER — TELEPHONE (OUTPATIENT)
Age: 34
End: 2024-11-05

## 2024-11-05 NOTE — TELEPHONE ENCOUNTER
[id number]    ient to review results per Dr. Ashby. Mailbox is full, unable to leave a message.     [employer] message sent.

## 2024-11-05 NOTE — TELEPHONE ENCOUNTER
----- Message from Blanca Ashby MD sent at 11/1/2024  4:43 PM EDT -----  Please inform patient only one elevated  level therefore no evidence of  GDM

## 2024-11-06 DIAGNOSIS — B96.89 BV (BACTERIAL VAGINOSIS): Primary | ICD-10-CM

## 2024-11-06 DIAGNOSIS — B37.9 YEAST DETECTED: ICD-10-CM

## 2024-11-06 DIAGNOSIS — N76.0 BV (BACTERIAL VAGINOSIS): Primary | ICD-10-CM

## 2024-11-06 RX ORDER — METRONIDAZOLE 500 MG/1
500 TABLET ORAL EVERY 12 HOURS SCHEDULED
Qty: 14 TABLET | Refills: 0 | Status: SHIPPED | OUTPATIENT
Start: 2024-11-06 | End: 2024-11-13

## 2024-11-06 RX ORDER — FLUCONAZOLE 150 MG/1
150 TABLET ORAL ONCE
Qty: 1 TABLET | Refills: 0 | Status: SHIPPED | OUTPATIENT
Start: 2024-11-06 | End: 2024-11-06

## 2024-11-07 ENCOUNTER — TELEPHONE (OUTPATIENT)
Age: 34
End: 2024-11-07

## 2024-11-07 NOTE — TELEPHONE ENCOUNTER
#[id number] on the line.     Mailbox is full and unable to leave message at this time. [employer] message sent. Attempt 1/3.

## 2024-11-07 NOTE — TELEPHONE ENCOUNTER
----- Message from Blanca Ashby MD sent at 11/6/2024  6:40 PM EST -----  Please inform patient  BV and yeast  in culture BV and diflucan sent

## 2024-11-08 NOTE — TELEPHONE ENCOUNTER
Patient's spouse Edmund returned call- patient with him, okay per comm form, reviewed results and recommendations. No further questions.

## 2024-11-12 LAB
DME PARACHUTE DELIVERY DATE REQUESTED: NORMAL
DME PARACHUTE ITEM DESCRIPTION: NORMAL
DME PARACHUTE ORDER STATUS: NORMAL
DME PARACHUTE SUPPLIER NAME: NORMAL
DME PARACHUTE SUPPLIER PHONE: NORMAL

## 2024-11-14 ENCOUNTER — ULTRASOUND (OUTPATIENT)
Dept: PERINATAL CARE | Facility: CLINIC | Age: 34
End: 2024-11-14
Payer: COMMERCIAL

## 2024-11-14 VITALS
SYSTOLIC BLOOD PRESSURE: 98 MMHG | BODY MASS INDEX: 33.31 KG/M2 | HEART RATE: 99 BPM | WEIGHT: 159.4 LBS | OXYGEN SATURATION: 98 % | DIASTOLIC BLOOD PRESSURE: 54 MMHG

## 2024-11-14 DIAGNOSIS — Z3A.32 32 WEEKS GESTATION OF PREGNANCY: Primary | ICD-10-CM

## 2024-11-14 DIAGNOSIS — Z36.89 ENCOUNTER FOR ULTRASOUND TO CHECK FETAL GROWTH: ICD-10-CM

## 2024-11-14 DIAGNOSIS — Z36.2 ENCOUNTER FOR FOLLOW-UP ULTRASOUND OF FETAL ANATOMY: ICD-10-CM

## 2024-11-14 PROCEDURE — 76816 OB US FOLLOW-UP PER FETUS: CPT | Performed by: STUDENT IN AN ORGANIZED HEALTH CARE EDUCATION/TRAINING PROGRAM

## 2024-11-14 NOTE — PROGRESS NOTES
This patient received  care under my supervision on 24 at 32w4d gestational age at [facility].  The note is contained in the ultrasound report located under OB Procedures tab in Epic.  Please call our office at [phone] with questions.  -Brenda Jewell MD

## 2024-11-19 PROBLEM — Z3A.33 33 WEEKS GESTATION OF PREGNANCY: Status: ACTIVE | Noted: 2024-08-20

## 2024-11-19 NOTE — ASSESSMENT & PLAN NOTE
3 hour GTT - WNL          
Continue prenatal vitamins daily  Continue fetal kick counts daily  Reviewed recommendation for RSV vaccine.  Written information provided  Reviewed recommendation for influenza vaccine, patient declines after counseling  Message sent to nurse navigators to reach out to patient to offer number for stork pump.  Awaiting breast pump.  And for [facility]  classes.  Common discomforts of pregnancy and precautions including  labor reviewed.  Signs and symptoms to report reviewed.             
Encouraged healthy diet and daily exercise          
Detail Level: Detailed

## 2024-11-21 ENCOUNTER — TELEPHONE (OUTPATIENT)
Dept: OBGYN CLINIC | Facility: MEDICAL CENTER | Age: 34
End: 2024-11-21

## 2024-11-21 NOTE — TELEPHONE ENCOUNTER
OB nurse navigator attempted to get in contact with patient for 2nd Trimester call but unable to complete call at this time. Patient unavailable. Unable to leave message VM full. [employer] message sent.

## 2024-11-22 ENCOUNTER — ROUTINE PRENATAL (OUTPATIENT)
Dept: OBGYN CLINIC | Facility: MEDICAL CENTER | Age: 34
End: 2024-11-22

## 2024-11-22 VITALS — SYSTOLIC BLOOD PRESSURE: 90 MMHG | BODY MASS INDEX: 34.17 KG/M2 | DIASTOLIC BLOOD PRESSURE: 60 MMHG | WEIGHT: 163.5 LBS

## 2024-11-22 DIAGNOSIS — O99.210 OBESITY IN PREGNANCY, ANTEPARTUM: ICD-10-CM

## 2024-11-22 DIAGNOSIS — Z3A.33 33 WEEKS GESTATION OF PREGNANCY: Primary | ICD-10-CM

## 2024-11-22 DIAGNOSIS — O99.810 ABNORMAL GLUCOSE TOLERANCE TEST (GTT) DURING PREGNANCY, ANTEPARTUM: ICD-10-CM

## 2024-11-22 PROCEDURE — PNV: Performed by: NURSE PRACTITIONER

## 2024-11-22 RX ORDER — METRONIDAZOLE 500 MG/1
1 TABLET ORAL 2 TIMES DAILY
COMMUNITY
Start: 2024-11-13

## 2024-11-22 RX ORDER — FLUCONAZOLE 150 MG/1
150 TABLET ORAL ONCE
COMMUNITY
Start: 2024-11-13 | End: 2024-11-22

## 2024-11-22 NOTE — PROGRESS NOTES
Name: Johnathon English      : 1990      MRN: [id number]  Encounter Provider: ROCKY Boles  Encounter Date: 2024   Enc[facility]: [facility]  : Patient is primarily Albanian-speaking [employer]  # [id number] used for translation during today's visit  Assessment & Plan  33 weeks gestation of pregnancy  Continue prenatal vitamins daily  Continue fetal kick counts daily  Reviewed recommendation for RSV vaccine.  Written information provided  Reviewed recommendation for influenza vaccine, patient declines after counseling  Message sent to nurse navigators to reach out to patient to offer number for stork pump.  Awaiting breast pump.  And for [facility]  classes.  Common discomforts of pregnancy and precautions including  labor reviewed.  Signs and symptoms to report reviewed.             Abnormal glucose tolerance test (GTT) during pregnancy, antepartum  3 hour GTT - WNL          Obesity in pregnancy, antepartum  Encouraged healthy diet and daily exercise          RTO 2 weeks     History of Present Illness     HPI  Johnathon English is a 34 y.o. female who presents for OB visit  Denies loss of fluid, vaginal bleeding and abdominal pain.  Confirms frequent fetal movement.  Doing fetal kick counts.  Tolerating prenatal vitamin well.  Denies questions or concerns at today's visit.  Has not received breast pump.    History obtained from: patient    Review of Systems   Constitutional:  Negative for chills and fever.   Respiratory: Negative.     Cardiovascular: Negative.      Medical History Reviewed by provider this encounter:  Allergies  Meds  Problems     .     Objective   BP 90/60   Wt 74.2 kg (163 lb 8 oz)   LMP 2024 (Exact Date)   BMI 34.17 kg/m²      Physical Exam  Vitals reviewed.   Constitutional:       Appearance: Normal appearance.   Neurological:      Mental Status: She is alert and oriented to person,  place, and time.   Psychiatric:         Mood and Affect: Mood normal.         Behavior: Behavior normal.

## 2024-12-02 PROBLEM — Z3A.35 35 WEEKS GESTATION OF PREGNANCY: Status: ACTIVE | Noted: 2024-08-20

## 2024-12-02 NOTE — ASSESSMENT & PLAN NOTE
Continue PNV daily  Continue FKC daily   Reviewed vaginal/ perineal massage encouraged 1-4 times per week   RSV declined   Common discomforts of pregnancy and precautions including  labor reviewed    Orders:    Strep B DNA probe, amplification    miconazole (MONISTAT 7) 100 mg vaginal suppository; Insert 1 suppository (100 mg total) into the vagina daily at bedtime

## 2024-12-06 ENCOUNTER — ROUTINE PRENATAL (OUTPATIENT)
Dept: OBGYN CLINIC | Facility: MEDICAL CENTER | Age: 34
End: 2024-12-06

## 2024-12-06 VITALS — WEIGHT: 165.4 LBS | BODY MASS INDEX: 34.57 KG/M2 | DIASTOLIC BLOOD PRESSURE: 80 MMHG | SYSTOLIC BLOOD PRESSURE: 108 MMHG

## 2024-12-06 DIAGNOSIS — B37.31 VAGINAL YEAST INFECTION: ICD-10-CM

## 2024-12-06 DIAGNOSIS — Z3A.35 35 WEEKS GESTATION OF PREGNANCY: ICD-10-CM

## 2024-12-06 DIAGNOSIS — O99.210 OBESITY IN PREGNANCY, ANTEPARTUM: Primary | ICD-10-CM

## 2024-12-06 DIAGNOSIS — O99.891 BACK PAIN IN PREGNANCY: ICD-10-CM

## 2024-12-06 DIAGNOSIS — M54.9 BACK PAIN IN PREGNANCY: ICD-10-CM

## 2024-12-06 PROCEDURE — PNV: Performed by: NURSE PRACTITIONER

## 2024-12-06 PROCEDURE — 87150 DNA/RNA AMPLIFIED PROBE: CPT | Performed by: NURSE PRACTITIONER

## 2024-12-06 RX ORDER — CLOTRIMAZOLE 1 %
CREAM WITH APPLICATOR VAGINAL
Qty: 7 SUPPOSITORY | Refills: 0 | OUTPATIENT
Start: 2024-12-06

## 2024-12-06 RX ORDER — LIDOCAINE 50 MG/G
1 PATCH TOPICAL DAILY
Qty: 30 PATCH | Refills: 0 | Status: SHIPPED | OUTPATIENT
Start: 2024-12-06 | End: 2024-12-13

## 2024-12-06 NOTE — PROGRESS NOTES
Name: Johnathon English      : 1990      MRN: [id number]  Encounter Provider: ROCKY Boles  Encounter Date: 2024   Encounter department: [facility]  : Patient is primarily Sammarinese-speaking [employer]  # [id number] used for translation during today's visit  Assessment & Plan  Obesity in pregnancy, antepartum    Orders:    Strep B DNA probe, amplification    35 weeks gestation of pregnancy  Continue PNV daily  Continue FKC daily   Reviewed vaginal/ perineal massage encouraged 1-4 times per week   RSV declined   Common discomforts of pregnancy and precautions including  labor reviewed    Orders:    Strep B DNA probe, amplification    miconazole (MONISTAT 7) 100 mg vaginal suppository; Insert 1 suppository (100 mg total) into the vagina daily at bedtime    Back pain in pregnancy  Reviewed ice, heat, lumbar support, belly band.  Encouraged to wear supportive footwear.  Stretches reviewed.  If no improvement can offer referral to PT  Orders:    lidocaine (Lidoderm) 5 %; Apply 1 patch topically over 12 hours daily Remove & Discard patch within 12 hours or as directed by MD    Vaginal yeast infection  Reviewed treatment with Monistat 7 1 applicatorful nightly for the next 7 nights.  Signs and symptoms to report reviewed  Orders:    miconazole (MONISTAT 7) 100 mg vaginal suppository; Insert 1 suppository (100 mg total) into the vagina daily at bedtime      RTO 1 week f/u GBS & back pain   History of Present Illness     HPI  Johnathon English is a 34 y.o. female who presents for prenatal visit.     Confirms frequent fetal movement.  Doing fetal kick counts.  denies loss of fluid, vaginal bleeding and contractions.  Tolerating prenatal vitamin well       Has been having some external vaginal irritation and itching for the past week and a half.  Denies significant changes in discharge discharge is white, thick at times.  Denies odor.  Has not  tried any OTC remedies.  Denies alleviating or aggravating factors.         Has been having lower back pain.  Worsens when laying down to sleep.  Pain radiates at times down right and left hip.  Alleviating factors include getting up and moving.  Aggravating factors include staying in 1 position for too long.  Has not tried any OTC remedies.    Review of Systems   Constitutional:  Negative for chills and fever.   Respiratory: Negative.     Cardiovascular: Negative.    Genitourinary:  Positive for vaginal discharge.   Musculoskeletal:  Positive for back pain.     Medical History Reviewed by provider this encounter:  Allergies  Meds  Problems     .     Objective   /80   Wt 75 kg (165 lb 6.4 oz)   LMP 03/31/2024 (Exact Date)   BMI 34.57 kg/m²      Physical Exam  Vitals reviewed. Exam conducted with a chaperone present.   Constitutional:       Appearance: Normal appearance.   Abdominal:      Palpations: Abdomen is soft.      Tenderness: There is no abdominal tenderness.      Comments: Gravid   Genitourinary:     Exam position: Lithotomy position.      Labia:         Right: No rash, tenderness, lesion or injury.         Left: No rash, tenderness, lesion or injury.       Vagina: Vaginal discharge present.      Cervix: Normal.      Comments: Moderate amount of thick white vaginal discharge adherent to vaginal walls noted on exam  Neurological:      Mental Status: She is alert and oriented to person, place, and time.   Psychiatric:         Mood and Affect: Mood normal.         Behavior: Behavior normal.

## 2024-12-09 ENCOUNTER — RESULTS FOLLOW-UP (OUTPATIENT)
Dept: OBGYN CLINIC | Facility: CLINIC | Age: 34
End: 2024-12-09

## 2024-12-09 LAB — GP B STREP DNA SPEC QL NAA+PROBE: POSITIVE

## 2024-12-11 PROBLEM — Z3A.36 36 WEEKS GESTATION OF PREGNANCY: Status: ACTIVE | Noted: 2024-08-20

## 2024-12-11 PROBLEM — B95.1 POSITIVE GBS TEST: Status: ACTIVE | Noted: 2024-12-11

## 2024-12-11 NOTE — TELEPHONE ENCOUNTER
# [id number]    Results and recommendations reviewed with pt and she verbalized understanding. Pt states she received a voicemail about her upcoming appointment but couldn't understand it. Reviewed no notes for upcoming appointment, reviewed next appointment is 12/13 at 2:45. Pt wondering when she will hear about her breast pump. Advised to discuss at appointment on 12/13. She verbalized understanding and is thankful.

## 2024-12-11 NOTE — PROGRESS NOTES
Name: Johnathon English      : 1990      MRN: [id number]  Encounter Provider: ROCKY Boles  Encounter Date: 2024   Encounter department: [facility]  :  Patient is primarily Belarusian-speaking prefers to use  for translation at today's visit.  Assessment & Plan  Obesity in pregnancy, antepartum         36 weeks gestation of pregnancy  Continue prenatal vitamins daily  Continue fetal kick counts daily  Continue vaginal/perineal massage 1-4 times per week   Common discomforts of pregnancy and precautions including labor reviewed.  Signs and symptoms to report reviewed.       Positive GBS test  GBS positive will need treatment in labor no penicillin allergy       RTO 1 week     History of Present Illness   HPI  Johnathon English is a 34 y.o. female who presents for PN visit.   Denies loss of fluid, vaginal bleeding and abdominal pain.  Confirms frequent fetal movement, baby moving a lot right now.  Movements were slower earlier today.  Doing fetal kick counts.  Tolerating prenatal vitamin well.  Reviewed GBS positive will need treatment in labor.  Denies questions or concerns at today's visit.    History obtained from: patient    Review of Systems   Constitutional:  Negative for chills and fever.   Respiratory: Negative.     Cardiovascular: Negative.      Medical History Reviewed by provider this encounter:  Allergies  Meds     .     Objective   LMP 2024 (Exact Date)      Physical Exam  Constitutional:       Appearance: Normal appearance.   Neurological:      Mental Status: She is alert and oriented to person, place, and time.   Psychiatric:         Mood and Affect: Mood normal.         Behavior: Behavior normal.

## 2024-12-11 NOTE — ASSESSMENT & PLAN NOTE
Continue prenatal vitamins daily  Continue fetal kick counts daily  Continue vaginal/perineal massage 1-4 times per week   Common discomforts of pregnancy and precautions including labor reviewed.  Signs and symptoms to report reviewed.

## 2024-12-13 ENCOUNTER — ROUTINE PRENATAL (OUTPATIENT)
Dept: OBGYN CLINIC | Facility: CLINIC | Age: 34
End: 2024-12-13

## 2024-12-13 VITALS — WEIGHT: 167 LBS | SYSTOLIC BLOOD PRESSURE: 110 MMHG | DIASTOLIC BLOOD PRESSURE: 60 MMHG | BODY MASS INDEX: 34.9 KG/M2

## 2024-12-13 DIAGNOSIS — Z3A.36 36 WEEKS GESTATION OF PREGNANCY: ICD-10-CM

## 2024-12-13 DIAGNOSIS — O99.210 OBESITY IN PREGNANCY, ANTEPARTUM: Primary | ICD-10-CM

## 2024-12-13 DIAGNOSIS — B95.1 POSITIVE GBS TEST: ICD-10-CM

## 2024-12-13 PROCEDURE — PNV: Performed by: NURSE PRACTITIONER

## 2024-12-17 ENCOUNTER — NURSE TRIAGE (OUTPATIENT)
Dept: OBGYN CLINIC | Facility: CLINIC | Age: 34
End: 2024-12-17

## 2024-12-17 ENCOUNTER — HOSPITAL ENCOUNTER (OUTPATIENT)
Facility: HOSPITAL | Age: 34
Discharge: HOME | End: 2024-12-17
Attending: OBSTETRICS & GYNECOLOGY | Admitting: OBSTETRICS & GYNECOLOGY
Payer: COMMERCIAL

## 2024-12-17 VITALS — DIASTOLIC BLOOD PRESSURE: 65 MMHG | HEART RATE: 79 BPM | SYSTOLIC BLOOD PRESSURE: 108 MMHG | OXYGEN SATURATION: 99 %

## 2024-12-17 PROBLEM — O09.32 INSUFFICIENT PRENATAL CARE IN SECOND TRIMESTER: Status: RESOLVED | Noted: 2024-09-06 | Resolved: 2024-12-17

## 2024-12-17 PROBLEM — O47.9 UTERINE CONTRACTIONS: Status: ACTIVE | Noted: 2024-12-17

## 2024-12-17 PROBLEM — Z3A.37 37 WEEKS GESTATION OF PREGNANCY: Status: ACTIVE | Noted: 2024-08-20

## 2024-12-17 PROCEDURE — 99212 OFFICE O/P EST SF 10 MIN: CPT

## 2024-12-17 PROCEDURE — NC001 PR NO CHARGE: Performed by: OBSTETRICS & GYNECOLOGY

## 2024-12-17 NOTE — TELEPHONE ENCOUNTER
"    Answer Assessment - Initial Assessment Questions  1. LOCATION: \"Where does it hurt?\"       head  2. ONSET: \"When did the headache start?\" (e.g., minutes, hours or days)       3 days ago  3. PATTERN: \"Does the pain come and go, or has it been constant since it started?\"      constant  6. CAUSE: \"What do you think is causing the headache?\"      unknown  7. MIGRAINE: \"Have you been diagnosed with migraine headaches?\" If Yes, ask: \"Is this headache similar?\"       No h/o migraines  8. HEAD INJURY: \"Has there been any recent injury to the head?\"       N/a  9. OTHER SYMPTOMS: \"Do you have any other symptoms?\" (e.g., abdomen pain, blurred vision, fever, stiff neck; swelling of hands, face, or feet)      Denies blurry vision, LOF, vaginal bleeding. States sudden swelling of hands/feet/face-whole body since yesterday.   10. PREGNANCY: \"How many weeks pregnant are you?\"        37w2d  11. ROSANNA: \"What date are you expecting to deliver?\"        01/05/2025    Protocols used: Pregnancy - Headache-Adult-[place]    "

## 2024-12-17 NOTE — PROGRESS NOTES
L&D Triage Note - OB/GYN  Johnathon English 34 y.o. female MRN: [id number]  Unit/Bed#:  TRIAGE  Encounter: [id number]    Patient is seen by [facility].    ASSESSMENT  Johnathon English is a 34 y.o.  at 37w2d here for r/o labor, as well as evaluation of swelling.    PLAN  #1. Abdominal pain:   Uma irregularly on tocometry, q1-4  SVE 0.5/long/high  Patient desires to go home to try and get comfortable in lieu of therapeutic rest/2-hour recheck    #2. Swelling:   Minimal, bilaterally symmetric LE edema  BP WNL  Patient reports that it has improved since she initially noticed it  Discussed normal limits of swelling in pregnancy    #3. Headache:   Resolved since arriving to triage  BP WNL  Discussed signs/symptoms of preeclampsia to monitor for    Discharge instructions  Patient instructed to call if experiencing worsening contractions, vaginal bleeding, loss of fluid or decreased fetal movement.  Will follow up with OBGYN per routine prenatal care.    D/w Dr. Allen.  ______________    SUBJECTIVE    ROSANNA: Estimated Date of Delivery: 25    HPI:  34 y.o.  37w2d presents with complaint of abdominal pain, swelling, and headache. She reports abdominal pain over the last few days, currently at 5/10. She reports that it comes in waves and fluctuates throughout the day. She also reports bilateral lower extremity edema that worsens at the end of the day, but states that it currently feels improved. She had also reported a headache, but states that has resolved. She denies any changes in vision, chest pain, difficulty breathing, RUQ pain. She has no other obstetric complaints.    Contractions: as above  Leakage of fluid: denies  Vaginal Bleeding: denies  Fetal movement: present    Her obstetrical history is not significant; this is her first pregnancy.    ROS:  Constitutional: Negative  Respiratory: Negative  Cardiovascular: Negative    Gastrointestinal: Negative    OBJECTIVE:    Vitals:  BP  108/65   Pulse 79   LMP 03/31/2024 (Exact Date)   SpO2 99%   There is no height or weight on file to calculate BMI.    Physical Exam  Vitals reviewed.   Constitutional:       Appearance: Normal appearance.   Cardiovascular:      Rate and Rhythm: Normal rate.   Abdominal:      Comments: Gravid, non-tender   Genitourinary:     General: Normal vulva.   Musculoskeletal:         General: Normal range of motion.   Skin:     General: Skin is warm and dry.   Neurological:      Mental Status: She is alert.       SVE: 0.5/20/-3  OB Examiner: Carloz    FHT:  Baseline Rate (FHR): 150 bpm  Variability: Moderate  Accelerations: 15 x 15 or greater  Decelerations: None    TOCO:   Contraction Frequency (minutes): 1.5-6  Contraction Duration (seconds):   Contraction Intensity: Mild/Moderate    Labs: No results found for this or any previous visit (from the past 24 hours).    Breanne Carty MD  12/17/2024  4:57 PM

## 2024-12-17 NOTE — PATIENT COMMUNICATION
Call connected with [employer]  #[phone]  Johnathon reporting sudden swelling of hands/feet/face since yesterday. Has noted headache x 3 days.  Minimal relief with tylenol.  Denies blurry vision, RUQ pain. Denies vaginal bleeding, LOF. +FM.    Unable to make appt for today until 3 3:30.  States she was unaware of appt. .     ESC to DR. Hill- To L/D for eval.   Patient in agreement. Will call  home to transport.

## 2024-12-27 ENCOUNTER — ROUTINE PRENATAL (OUTPATIENT)
Dept: OBGYN CLINIC | Facility: MEDICAL CENTER | Age: 34
End: 2024-12-27

## 2024-12-27 VITALS — SYSTOLIC BLOOD PRESSURE: 108 MMHG | WEIGHT: 167 LBS | BODY MASS INDEX: 34.9 KG/M2 | DIASTOLIC BLOOD PRESSURE: 70 MMHG

## 2024-12-27 DIAGNOSIS — Z3A.38 38 WEEKS GESTATION OF PREGNANCY: Primary | ICD-10-CM

## 2024-12-27 DIAGNOSIS — B95.1 POSITIVE GBS TEST: ICD-10-CM

## 2024-12-27 PROCEDURE — PNV: Performed by: OBSTETRICS & GYNECOLOGY

## 2024-12-27 NOTE — PROGRESS NOTES
Routine Prenatal Visit  [facility] [address], [place], [place]      OB/GYN Prenatal Visit    ASSESSMENT / PLAN:  1. 38 weeks gestation of pregnancy  Assessment & Plan:  No genetic testing  Last scan 32 weeks reviewed.     IOL vs spontaneous discussed with the patient    2. Positive GBS test  Assessment & Plan:  Treat in labor         SUBJECTIVE:  Johnathon English is a 34 y.o.  at 38  here for prenatal visit.  She has no obstetric complaints and denies pelvic pain, cramping/contractions, vaginal bleeding, loss of fluid, or decreased fetal movement.       The following portions of the patient's history were reviewed and updated as appropriate: allergies, current medications, past family history, past medical history, obstetric history, gynecologic history, past social history, past surgical history and problem list.      Objective:  /70   Wt 75.8 kg (167 lb)   LMP 2024 (Exact Date)   BMI 34.90 kg/m²   Pregravid Weight/BMI: 62.6 kg (138 lb) (BMI 28.85)  Current Weight: 75.8 kg (167 lb)   Total Weight Gain: 13.2 kg (29 lb)   Pre- Vitals      Flowsheet Row Most Recent Value   Prenatal Assessment    Fetal Heart Rate 136   Movement Present   Prenatal Vitals    Blood Pressure 108/70   Weight - Scale 75.8 kg (167 lb)   Urine Albumin/Glucose    Dilation/Effacement/Station    Vaginal Drainage    Draining Fluid No   Edema    LLE Edema None   RLE Edema None   Facial Edema None               Physical Exam:    General: Well appearing, no distress  Respiratory: Unlabored breathing  Cardiovascular: Regular rate.  Abdomen: Soft, gravid, nontender  Fundal Height: Appropriate for gestational age.  Extremities: Warm and well perfused.  Non tender.      German Watt MD

## 2024-12-27 NOTE — ASSESSMENT & PLAN NOTE
No genetic testing  Last scan 32 weeks reviewed.     IOL vs spontaneous discussed with the patient    She does not wish to be induced  Went over labor precautions used

## 2025-01-01 PROBLEM — Z3A.39 39 WEEKS GESTATION OF PREGNANCY: Status: ACTIVE | Noted: 2024-08-20

## 2025-01-01 NOTE — ASSESSMENT & PLAN NOTE
Continue prenatal vitamins daily  Continue fetal kick counts daily  Continue vaginal/perineal massage 1-4 times per week  Common discomforts of pregnancy and precautions including labor reviewed.  Signs and symptoms to report reviewed.  Orders:    Transfer to other facility

## 2025-01-01 NOTE — PROGRESS NOTES
Name: Johnathon English      : 1990      MRN: [id number]  Encounter Provider: ROCKY Boles  Encounter Date: 1/3/2025   Encounter department: [facility]  :  Patient is primarily Icelandic-speaking MEGAN Meier at bedside  Assessment & Plan  Obesity in pregnancy, antepartum         39 weeks gestation of pregnancy  Continue prenatal vitamins daily  Continue fetal kick counts daily  Continue vaginal/perineal massage 1-4 times per week  Common discomforts of pregnancy and precautions including labor reviewed.  Signs and symptoms to report reviewed.  Orders:    Transfer to other facility    Positive GBS test  Will need treatment in labor.  No penicillin allergy       Leakage of amniotic fluid  Nitrazine positive.  Ferning positive  ADT 21 completed.  Patient sent to labor and delivery [place]  ESC sent to Dr. Murillo  Orders:    Transfer to other facility      RTO PP exam     History of Present Illness   HPI  Johnathon English is a 34 y.o. female who presents for PN visit   Denies vaginal bleeding.  Complains of 3 episodes of vaginal leaking. Large amounts leaking down leg.  Admits to [facility] at night.  Confirms frequent fetal movement.  Doing fetal kick counts.  Tolerating prenatal vitamin well.     History obtained from: patient    Review of Systems   Constitutional:  Negative for chills and fever.   Respiratory: Negative.     Cardiovascular: Negative.      Medical History Reviewed by provider this encounter:  Allergies  Meds     .     Objective   /70   Wt 78.5 kg (173 lb 1.6 oz)   LMP 2024 (Exact Date)   BMI 36.18 kg/m²      Physical Exam  Constitutional:       Appearance: Normal appearance.   Neurological:      Mental Status: She is alert and oriented to person, place, and time.   Psychiatric:         Mood and Affect: Mood normal.         Behavior: Behavior normal.

## 2025-01-03 ENCOUNTER — ROUTINE PRENATAL (OUTPATIENT)
Dept: OBGYN CLINIC | Facility: MEDICAL CENTER | Age: 35
End: 2025-01-03

## 2025-01-03 ENCOUNTER — HOSPITAL ENCOUNTER (INPATIENT)
Facility: HOSPITAL | Age: 35
LOS: 5 days | Discharge: HOME | End: 2025-01-08
Attending: OBSTETRICS & GYNECOLOGY | Admitting: OBSTETRICS & GYNECOLOGY
Payer: COMMERCIAL

## 2025-01-03 VITALS — BODY MASS INDEX: 36.18 KG/M2 | DIASTOLIC BLOOD PRESSURE: 70 MMHG | SYSTOLIC BLOOD PRESSURE: 120 MMHG | WEIGHT: 173.1 LBS

## 2025-01-03 DIAGNOSIS — B95.1 POSITIVE GBS TEST: ICD-10-CM

## 2025-01-03 DIAGNOSIS — O47.9 UTERINE CONTRACTIONS: ICD-10-CM

## 2025-01-03 DIAGNOSIS — Z3A.39 39 WEEKS GESTATION OF PREGNANCY: ICD-10-CM

## 2025-01-03 DIAGNOSIS — Z13.9 ENCOUNTER FOR SCREENING INVOLVING SOCIAL DETERMINANTS OF HEALTH (SDOH): ICD-10-CM

## 2025-01-03 DIAGNOSIS — O42.90 LEAKAGE OF AMNIOTIC FLUID: ICD-10-CM

## 2025-01-03 DIAGNOSIS — O99.210 OBESITY IN PREGNANCY, ANTEPARTUM: Primary | ICD-10-CM

## 2025-01-03 DIAGNOSIS — Z98.891 S/P CESAREAN SECTION: ICD-10-CM

## 2025-01-03 LAB
ABO GROUP BLD: NORMAL
BLD GP AB SCN SERPL QL: NEGATIVE
ERYTHROCYTE [DISTWIDTH] IN BLOOD BY AUTOMATED COUNT: 14.5 % (ref 11.6–15.1)
HCT VFR BLD AUTO: 35.7 % (ref 34.8–46.1)
HGB BLD-MCNC: 11.6 G/DL (ref 11.5–15.4)
HOLD SPECIMEN: YES
MCH RBC QN AUTO: 26.7 PG (ref 26.8–34.3)
MCHC RBC AUTO-ENTMCNC: 32.5 G/DL (ref 31.4–37.4)
MCV RBC AUTO: 82 FL (ref 82–98)
PLATELET # BLD AUTO: 197 THOUSANDS/UL (ref 149–390)
PMV BLD AUTO: 11.4 FL (ref 8.9–12.7)
RBC # BLD AUTO: 4.35 MILLION/UL (ref 3.81–5.12)
RH BLD: POSITIVE
SPECIMEN EXPIRATION DATE: NORMAL
TREPONEMA PALLIDUM IGG+IGM AB [PRESENCE] IN SERUM OR PLASMA BY IMMUNOASSAY: NORMAL
WBC # BLD AUTO: 6.55 THOUSAND/UL (ref 4.31–10.16)

## 2025-01-03 PROCEDURE — 86900 BLOOD TYPING SEROLOGIC ABO: CPT

## 2025-01-03 PROCEDURE — 86920 COMPATIBILITY TEST SPIN: CPT

## 2025-01-03 PROCEDURE — PNV: Performed by: NURSE PRACTITIONER

## 2025-01-03 PROCEDURE — 86901 BLOOD TYPING SEROLOGIC RH(D): CPT

## 2025-01-03 PROCEDURE — 85027 COMPLETE CBC AUTOMATED: CPT

## 2025-01-03 PROCEDURE — NC001 PR NO CHARGE: Performed by: OBSTETRICS & GYNECOLOGY

## 2025-01-03 PROCEDURE — 86780 TREPONEMA PALLIDUM: CPT

## 2025-01-03 PROCEDURE — 86850 RBC ANTIBODY SCREEN: CPT

## 2025-01-03 RX ORDER — BUPIVACAINE HYDROCHLORIDE 2.5 MG/ML
30 INJECTION, SOLUTION EPIDURAL; INFILTRATION; INTRACAUDAL ONCE AS NEEDED
Status: DISCONTINUED | OUTPATIENT
Start: 2025-01-03 | End: 2025-01-05

## 2025-01-03 RX ORDER — OXYTOCIN/RINGER'S LACTATE 30/500 ML
1-30 PLASTIC BAG, INJECTION (ML) INTRAVENOUS
Status: DISCONTINUED | OUTPATIENT
Start: 2025-01-03 | End: 2025-01-05

## 2025-01-03 RX ORDER — ACETAMINOPHEN 325 MG/1
975 TABLET ORAL ONCE
Status: COMPLETED | OUTPATIENT
Start: 2025-01-03 | End: 2025-01-04

## 2025-01-03 RX ORDER — SODIUM CHLORIDE, SODIUM LACTATE, POTASSIUM CHLORIDE, CALCIUM CHLORIDE 600; 310; 30; 20 MG/100ML; MG/100ML; MG/100ML; MG/100ML
125 INJECTION, SOLUTION INTRAVENOUS CONTINUOUS
Status: DISCONTINUED | OUTPATIENT
Start: 2025-01-03 | End: 2025-01-05

## 2025-01-03 RX ADMIN — SODIUM CHLORIDE, SODIUM LACTATE, POTASSIUM CHLORIDE, AND CALCIUM CHLORIDE 125 ML/HR: .6; .31; .03; .02 INJECTION, SOLUTION INTRAVENOUS at 17:20

## 2025-01-03 RX ADMIN — Medication 2.5 MILLION UNITS: at 20:23

## 2025-01-03 RX ADMIN — Medication 50 MCG: at 18:26

## 2025-01-03 RX ADMIN — SODIUM CHLORIDE 5 MILLION UNITS: 0.9 INJECTION, SOLUTION INTRAVENOUS at 17:20

## 2025-01-03 NOTE — ASSESSMENT & PLAN NOTE
Noted to be ruptured during PNV today with +ferning, +nitrazine, +pooling  SVE 1/20/-4  Admit for IOL in setting of PROM  Admission labs: CBC, T&S, syphilis screen  Anesthesia consulted  Clear liquid diet   Rh+, no Rhogam indicated  GBS+, prophylaxis indicated   IOL with PO cytotec, transition to pitocin; consents signed

## 2025-01-03 NOTE — H&P
H & P- Obstetrics   Johnathon English 34 y.o. female MRN: [id number]  Unit/Bed#: -01 Encounter: [id number]    Assessment: 34 y.o.  at 39w5d admitted for PROM.  SVE:   FHT: cat 1  Clinical EFW: 8 lbs; Cephalic confirmed by TAUS  GBS status: positive    Plan:   Positive GBS test  Assessment & Plan  Penicillin ordered    Abnormal glucose tolerance test (GTT) during pregnancy, antepartum  Assessment & Plan  Early ; 3 hour WNL     39 weeks gestation of pregnancy  Assessment & Plan  PNL wnl   B+  GBS+  EFW 4 lbs 8 oz  (45%) at 32w4d    * Premature rupture of membranes  Assessment & Plan  Noted to be ruptured during PNV today with +ferning, +nitrazine, +pooling  SVE   Admit for IOL in setting of PROM  Admission labs: CBC, T&S, syphilis screen  Anesthesia consulted  Clear liquid diet   Rh+, no Rhogam indicated  GBS+, prophylaxis indicated   IOL with PO cytotec, transition to pitocin; consents signed      Discussed case and plan w/ Dr. Ashby.    Chief Complaint: leaking fluid    HPI: Johnathon English is a 34 y.o.  with an ROSANNA of 2025, by Last Menstrual Period at 39w5d who is being admitted for ruptured membranes. She presented for routine PNV today with complaint of leaking fluid. Exam at that time was consistent with ROM: +ferning, +nitrazine, _+pooling. She denies consistent contractions but states she experiences lots of contractions at night. She reports good fetal movement and denies vaginal bleeding.     Patient Active Problem List   Diagnosis    Overweight    39 weeks gestation of pregnancy    Obesity in pregnancy, antepartum    Abnormal glucose tolerance test (GTT) during pregnancy, antepartum    Positive GBS test    Uterine contractions    Premature rupture of membranes     Baby complications/comments: none    Review of Systems   Constitutional:  Negative for chills and fever.   Respiratory:  Negative for cough and shortness of breath.     Gastrointestinal:  Negative for diarrhea, nausea and vomiting.   Genitourinary:  Negative for dysuria and hematuria.   Skin:  Negative for color change and rash.   Neurological:  Negative for dizziness, syncope and headaches.       OB Hx:  OB History    Para Term  AB Living   1 0 0 0 0 0   SAB IAB Ectopic Multiple Live Births   0 0 0 0 0      # Outcome Date GA Lbr Jordan/2nd Weight Sex Type Anes PTL Lv   1 Current                Past Medical Hx:  Past Medical History:   Diagnosis Date    History of chickenpox 2024       Past Surgical hx:  Past Surgical History:   Procedure Laterality Date    LIPOMA RESECTION      located on her back       Social Hx:  Alcohol use: denies  Tobacco use: denies  Other substance use: denies    No Known Allergies      Medications Prior to Admission:     Prenatal Vit-Fe Fumarate-FA (PRENATAL VITAMIN PO)    acetaminophen (TYLENOL) 650 mg CR tablet    Objective:  Temp:  [97.1 °F (36.2 °C)] 97.1 °F (36.2 °C)  HR:  [92] 92  BP: (120-121)/(70-74) 121/74  Resp:  [18] 18  Body mass index is 36.18 kg/m².     Physical Exam:  Physical Exam  Constitutional:       Appearance: Normal appearance.   Genitourinary:      Vulva normal.   Cardiovascular:      Rate and Rhythm: Normal rate.   Pulmonary:      Effort: Pulmonary effort is normal.   Abdominal:      Palpations: Abdomen is soft.   Neurological:      General: No focal deficit present.      Mental Status: She is alert and oriented to person, place, and time.   Skin:     General: Skin is dry.   Psychiatric:         Mood and Affect: Mood normal.            FHT:  Baseline Rate (FHR): 145 bpm  Variability: Moderate  Accelerations: 15 x 15 or greater  Decelerations: None    TOCO:   Contraction Frequency (minutes): 2-5  Contraction Duration (seconds):   Contraction Intensity: Mild/Moderate    Lab Results   Component Value Date    WBC 6.55 2025    HGB 11.6 2025    HCT 35.7 2025     2025     Lab Results    Component Value Date    K 3.6 07/11/2024     07/11/2024    CO2 26 07/11/2024    BUN 3 (L) 07/11/2024    CREATININE 0.50 (L) 07/11/2024    AST 18 07/11/2024    ALT 12 07/11/2024     Prenatal Labs: Reviewed      Blood type: B+  Antibody: negative  GBS: positive  HIV: non-reactive  Rubella: immune  Syphilis IgM/IgG: non-reactive  HBsAg: non-reactive  HCAb: non-reactive  Chlamydia: negative  Gonorrhea: negative  Diabetes 1 hour screen: elevated  3 hour glucose: passed  Platelets: 223, pending admission CBC  Hgb: 11.7, pending admission CBC  >2 Midnights  INPATIENT     Signature/Title: Breanne Carty MD  Date: 1/3/2025  Time: 6:25 PM\

## 2025-01-03 NOTE — ASSESSMENT & PLAN NOTE
Continue routine post partum care  QBL 1859, HgB 11.6 -> 7.5 -> 6.9, patient considering if she will accept 1u pRBC  Pain well controlled: tylenol/motrin scheduled, beverley prn  Lochia within normal limits: continue to monitor   OOB: as able, encourage ambulation  Passing flatus  Voiding spontaneously s/p void trial  DVT ppx: SCD, Lovenox 40mg to be ordered once HgB stabilizes  Encourage breastfeeding  Baby in: room  Dispo: anticipate d/c home POD2-4

## 2025-01-03 NOTE — PLAN OF CARE
Problem: ANTEPARTUM  Goal: Maintain pregnancy as long as maternal and/or fetal condition is stable  Description: INTERVENTIONS:  - Maternal surveillance  - Fetal surveillance  - Monitor uterine activity  - Medications as ordered  - Bedrest  Outcome: Progressing     Problem: BIRTH - VAGINAL/ SECTION  Goal: Fetal and maternal status remain reassuring during the birth process  Description: INTERVENTIONS:  - Monitor vital signs  - Monitor fetal heart rate  - Monitor uterine activity  - Monitor labor progression (vaginal delivery)  - DVT prophylaxis  - Antibiotic prophylaxis  Outcome: Progressing  Goal: Emotionally satisfying birthing experience for mother/fetus  Description: Interventions:  - Assess, plan, implement and evaluate the nursing care given to the patient in labor  - Advocate the philosophy that each childbirth experience is a unique experience and support the family's chosen level of involvement and control during the labor process   - Actively participate in both the patient's and family's teaching of the birth process  - Consider cultural, Anabaptist and age-specific factors and plan care for the patient in labor  Outcome: Progressing

## 2025-01-04 RX ORDER — ACETAMINOPHEN 325 MG/1
975 TABLET ORAL EVERY 6 HOURS PRN
Status: DISCONTINUED | OUTPATIENT
Start: 2025-01-04 | End: 2025-01-08 | Stop reason: HOSPADM

## 2025-01-04 RX ADMIN — ACETAMINOPHEN 975 MG: 325 TABLET, FILM COATED ORAL at 04:49

## 2025-01-04 RX ADMIN — SODIUM CHLORIDE, SODIUM LACTATE, POTASSIUM CHLORIDE, AND CALCIUM CHLORIDE 125 ML/HR: .6; .31; .03; .02 INJECTION, SOLUTION INTRAVENOUS at 18:24

## 2025-01-04 RX ADMIN — MELATONIN 3 MG: 3 TAB ORAL at 22:03

## 2025-01-04 RX ADMIN — Medication 2.5 MILLION UNITS: at 14:49

## 2025-01-04 RX ADMIN — Medication 2 MILLI-UNITS/MIN: at 00:57

## 2025-01-04 RX ADMIN — Medication 50 MCG: at 22:08

## 2025-01-04 RX ADMIN — SODIUM CHLORIDE, SODIUM LACTATE, POTASSIUM CHLORIDE, AND CALCIUM CHLORIDE 125 ML/HR: .6; .31; .03; .02 INJECTION, SOLUTION INTRAVENOUS at 06:49

## 2025-01-04 RX ADMIN — Medication 25 MCG: at 17:30

## 2025-01-04 RX ADMIN — Medication 2.5 MILLION UNITS: at 00:55

## 2025-01-04 RX ADMIN — ACETAMINOPHEN 975 MG: 325 TABLET, FILM COATED ORAL at 22:03

## 2025-01-04 RX ADMIN — Medication 2.5 MILLION UNITS: at 08:10

## 2025-01-04 RX ADMIN — Medication 2.5 MILLION UNITS: at 04:06

## 2025-01-04 RX ADMIN — Medication 2.5 MILLION UNITS: at 18:24

## 2025-01-04 RX ADMIN — ACETAMINOPHEN 975 MG: 325 TABLET, FILM COATED ORAL at 12:56

## 2025-01-04 RX ADMIN — Medication 2.5 MILLION UNITS: at 23:05

## 2025-01-04 NOTE — OB LABOR/OXYTOCIN SAFETY PROGRESS
Oxytocin Safety Progress Check Note - Johnathon English 34 y.o. female MRN: [id number]    Unit/Bed#: -01 Encounter: [id number]    Dose (arnel-units/min) Oxytocin: 8 arnel-units/min  Contraction Frequency (minutes): 1-4  Contraction Intensity: Mild/Moderate  Uterine Activity Characteristics: Regular  Cervical Dilation: 2        Cervical Effacement: 70  Fetal Station: -3  Baseline Rate (FHR): 120 bpm  Fetal Heart Rate (FHT): 120 BPM  FHR Category: 1               Vital Signs:   Vitals:    01/04/25 0405   BP: 115/61   Pulse: 74   Resp:    Temp: 97.8 °F (36.6 °C)       Notes/comments:   Minimally changed, IUPC placed for difficulty titrating pitocin      Julieta Gao MD 1/4/2025 4:36 AM

## 2025-01-04 NOTE — OB LABOR/OXYTOCIN SAFETY PROGRESS
Labor Progress Note - Johnathon English 34 y.o. female MRN: [id number]    Unit/Bed#: -01 Encounter: [id number]       Contraction Frequency (minutes): 1-4  Contraction Intensity: Mild/Moderate  Uterine Activity Characteristics: Regular  Cervical Dilation: 1        Cervical Effacement: 50  Fetal Station: -3  Baseline Rate (FHR): 145 bpm  Fetal Heart Rate (FHT): 154 BPM  FHR Category: 1               Vital Signs:   Vitals:    01/03/25 2027   BP:    Pulse:    Resp:    Temp: 98.5 °F (36.9 °C)       Notes/comments:   Patient amenable to starting pitocin given unchanged cervical exam after cytotec      Julieta Gao MD 1/3/2025 11:26 PM

## 2025-01-04 NOTE — PLAN OF CARE
Problem: ANTEPARTUM  Goal: Maintain pregnancy as long as maternal and/or fetal condition is stable  Description: INTERVENTIONS:  - Maternal surveillance  - Fetal surveillance  - Monitor uterine activity  - Medications as ordered  - Bedrest  Outcome: Progressing     Problem: BIRTH - VAGINAL/ SECTION  Goal: Fetal and maternal status remain reassuring during the birth process  Description: INTERVENTIONS:  - Monitor vital signs  - Monitor fetal heart rate  - Monitor uterine activity  - Monitor labor progression (vaginal delivery)  - DVT prophylaxis  - Antibiotic prophylaxis  Outcome: Progressing  Goal: Emotionally satisfying birthing experience for mother/fetus  Description: Interventions:  - Assess, plan, implement and evaluate the nursing care given to the patient in labor  - Advocate the philosophy that each childbirth experience is a unique experience and support the family's chosen level of involvement and control during the labor process   - Actively participate in both the patient's and family's teaching of the birth process  - Consider cultural, Christianity and age-specific factors and plan care for the patient in labor  Outcome: Progressing

## 2025-01-04 NOTE — OB LABOR/OXYTOCIN SAFETY PROGRESS
Oxytocin Safety Progress Check Note - Johnathon English 34 y.o. female MRN: [id number]    Unit/Bed#: -01 Encounter: [id number]    Dose (arnel-units/min) Oxytocin: 12 arnel-units/min  Contraction Frequency (minutes): 1-2 (difficult to trace patient up to use bathroom)  Contraction Intensity: Mild/Moderate  Uterine Activity Characteristics: Regular  Cervical Dilation: 2        Cervical Effacement: 70  Fetal Station: -3  Baseline Rate (FHR): 130 bpm  Fetal Heart Rate (FHT): 120 BPM  FHR Category: cat 1               Vital Signs:   Vitals:    01/04/25 0922   BP: 110/67   Pulse: 80   Resp:    Temp: 98.6 °F (37 °C)       Notes/comments:     SVE unchanged. Continue pitocin titration    Jeannie Espinoza DO 1/4/2025 10:23 AM

## 2025-01-04 NOTE — OB LABOR/OXYTOCIN SAFETY PROGRESS
Oxytocin Safety Progress Check Note - Johnathon English 34 y.o. female MRN: [id number]    Unit/Bed#: -01 Encounter: [id number]    Dose (arnel-units/min) Oxytocin: 14 arnel-units/min  Contraction Frequency (minutes): 1.5-2  Contraction Intensity: Mild/Moderate  Uterine Activity Characteristics: Regular  Cervical Dilation: 2        Cervical Effacement: 70  Fetal Station: -3  Baseline Rate (FHR): 135 bpm  Fetal Heart Rate (FHT): 120 BPM  FHR Category: cat 1               Vital Signs:   Vitals:    01/04/25 1248   BP: 118/58   Pulse: 66   Resp:    Temp: 98.1 °F (36.7 °C)       Notes/comments:     SVE deferred due to prolonged rupture. Continue pitocin changes and movements.     Jeannie Espinoza DO 1/4/2025 12:57 PM

## 2025-01-04 NOTE — OB LABOR/OXYTOCIN SAFETY PROGRESS
Oxytocin Safety Progress Check Note - Johnathon English 34 y.o. female MRN: [id number]    Unit/Bed#: -01 Encounter: [id number]    Dose (arnel-units/min) Oxytocin: 0 arnel-units/min (Per Dr. Espinoza)  Contraction Frequency (minutes): 1.5-2.5  Contraction Intensity: Mild/Moderate  Uterine Activity Characteristics: Regular  Cervical Dilation: 2        Cervical Effacement: 70  Fetal Station: -3  Baseline Rate (FHR): 150 bpm  Fetal Heart Rate (FHT): 120 BPM  FHR Category: cat 1               Vital Signs:   Vitals:    01/04/25 1447   BP: 121/57   Pulse: 75   Resp:    Temp:        Notes/comments:     Patient continues to be unchanges, pitocin at 16. Plan to turn off pitocin, wait for contractions to space out, then offer dose of cytotec.  Discussed plan of care with Dr Jeremias Espinoza DO 1/4/2025 3:59 PM

## 2025-01-05 ENCOUNTER — ANESTHESIA (INPATIENT)
Dept: LABOR AND DELIVERY | Facility: HOSPITAL | Age: 35
End: 2025-01-05
Payer: COMMERCIAL

## 2025-01-05 ENCOUNTER — ANESTHESIA EVENT (INPATIENT)
Dept: LABOR AND DELIVERY | Facility: HOSPITAL | Age: 35
End: 2025-01-05
Payer: COMMERCIAL

## 2025-01-05 LAB
BASE EXCESS BLDCOV CALC-SCNC: -4.2 MMOL/L (ref 1–9)
ERYTHROCYTE [DISTWIDTH] IN BLOOD BY AUTOMATED COUNT: 14.4 % (ref 11.6–15.1)
HCO3 BLDCOV-SCNC: 21.6 MMOL/L (ref 12.2–28.6)
HCT VFR BLD AUTO: 23.3 % (ref 34.8–46.1)
HGB BLD-MCNC: 7.5 G/DL (ref 11.5–15.4)
MCH RBC QN AUTO: 26.7 PG (ref 26.8–34.3)
MCHC RBC AUTO-ENTMCNC: 32.2 G/DL (ref 31.4–37.4)
MCV RBC AUTO: 83 FL (ref 82–98)
OXYHGB MFR BLDCOV: 60 %
PCO2 BLDCOV: 42.3 MM HG (ref 27–43)
PH BLDCOV: 7.33 [PH] (ref 7.19–7.49)
PLATELET # BLD AUTO: 145 THOUSANDS/UL (ref 149–390)
PMV BLD AUTO: 11.8 FL (ref 8.9–12.7)
PO2 BLDCOV: 26 MM HG (ref 15–45)
RBC # BLD AUTO: 2.81 MILLION/UL (ref 3.81–5.12)
SAO2 % BLDCOV: 12.1 ML/DL
WBC # BLD AUTO: 8.97 THOUSAND/UL (ref 4.31–10.16)

## 2025-01-05 PROCEDURE — 82805 BLOOD GASES W/O2 SATURATION: CPT | Performed by: OBSTETRICS & GYNECOLOGY

## 2025-01-05 PROCEDURE — 3E033VJ INTRODUCTION OF OTHER HORMONE INTO PERIPHERAL VEIN, PERCUTANEOUS APPROACH: ICD-10-PCS | Performed by: OBSTETRICS & GYNECOLOGY

## 2025-01-05 PROCEDURE — 59510 CESAREAN DELIVERY: CPT | Performed by: OBSTETRICS & GYNECOLOGY

## 2025-01-05 PROCEDURE — 10H07YZ INSERTION OF OTHER DEVICE INTO PRODUCTS OF CONCEPTION, VIA NATURAL OR ARTIFICIAL OPENING: ICD-10-PCS | Performed by: OBSTETRICS & GYNECOLOGY

## 2025-01-05 PROCEDURE — 85027 COMPLETE CBC AUTOMATED: CPT

## 2025-01-05 PROCEDURE — 3E0DXGC INTRODUCTION OF OTHER THERAPEUTIC SUBSTANCE INTO MOUTH AND PHARYNX, EXTERNAL APPROACH: ICD-10-PCS | Performed by: OBSTETRICS & GYNECOLOGY

## 2025-01-05 PROCEDURE — NC001 PR NO CHARGE: Performed by: NURSE PRACTITIONER

## 2025-01-05 RX ORDER — ACETAMINOPHEN 325 MG/1
975 TABLET ORAL EVERY 6 HOURS SCHEDULED
Status: DISPENSED | OUTPATIENT
Start: 2025-01-05 | End: 2025-01-08

## 2025-01-05 RX ORDER — OXYCODONE HYDROCHLORIDE 5 MG/1
10 TABLET ORAL EVERY 4 HOURS PRN
Status: DISCONTINUED | OUTPATIENT
Start: 2025-01-06 | End: 2025-01-08 | Stop reason: HOSPADM

## 2025-01-05 RX ORDER — CEFAZOLIN SODIUM 2 G/50ML
2000 SOLUTION INTRAVENOUS ONCE
Status: COMPLETED | OUTPATIENT
Start: 2025-01-05 | End: 2025-01-05

## 2025-01-05 RX ORDER — KETOROLAC TROMETHAMINE 30 MG/ML
INJECTION, SOLUTION INTRAMUSCULAR; INTRAVENOUS AS NEEDED
Status: DISCONTINUED | OUTPATIENT
Start: 2025-01-05 | End: 2025-01-05

## 2025-01-05 RX ORDER — OXYTOCIN/RINGER'S LACTATE 30/500 ML
PLASTIC BAG, INJECTION (ML) INTRAVENOUS
Status: DISCONTINUED
Start: 2025-01-05 | End: 2025-01-05 | Stop reason: WASHOUT

## 2025-01-05 RX ORDER — PROMETHAZINE HYDROCHLORIDE 25 MG/ML
INJECTION, SOLUTION INTRAMUSCULAR; INTRAVENOUS
Status: COMPLETED
Start: 2025-01-05 | End: 2025-01-05

## 2025-01-05 RX ORDER — BUPIVACAINE HYDROCHLORIDE 7.5 MG/ML
INJECTION, SOLUTION INTRASPINAL AS NEEDED
Status: DISCONTINUED | OUTPATIENT
Start: 2025-01-05 | End: 2025-01-05

## 2025-01-05 RX ORDER — ENOXAPARIN SODIUM 100 MG/ML
40 INJECTION SUBCUTANEOUS
Status: DISCONTINUED | OUTPATIENT
Start: 2025-01-06 | End: 2025-01-08 | Stop reason: HOSPADM

## 2025-01-05 RX ORDER — IBUPROFEN 600 MG/1
600 TABLET, FILM COATED ORAL EVERY 6 HOURS
Status: DISCONTINUED | OUTPATIENT
Start: 2025-01-06 | End: 2025-01-05 | Stop reason: SDUPTHER

## 2025-01-05 RX ORDER — KETOROLAC TROMETHAMINE 30 MG/ML
15 INJECTION, SOLUTION INTRAMUSCULAR; INTRAVENOUS EVERY 6 HOURS SCHEDULED
Status: DISCONTINUED | OUTPATIENT
Start: 2025-01-05 | End: 2025-01-05

## 2025-01-05 RX ORDER — METHYLERGONOVINE MALEATE 0.2 MG/ML
INJECTION INTRAVENOUS
Status: COMPLETED
Start: 2025-01-05 | End: 2025-01-05

## 2025-01-05 RX ORDER — METHYLERGONOVINE MALEATE 0.2 MG/ML
INJECTION INTRAVENOUS AS NEEDED
Status: DISCONTINUED | OUTPATIENT
Start: 2025-01-05 | End: 2025-01-05

## 2025-01-05 RX ORDER — KETOROLAC TROMETHAMINE 30 MG/ML
15 INJECTION, SOLUTION INTRAMUSCULAR; INTRAVENOUS EVERY 6 HOURS
Status: COMPLETED | OUTPATIENT
Start: 2025-01-05 | End: 2025-01-06

## 2025-01-05 RX ORDER — CARBOPROST TROMETHAMINE 250 UG/ML
INJECTION, SOLUTION INTRAMUSCULAR AS NEEDED
Status: DISCONTINUED | OUTPATIENT
Start: 2025-01-05 | End: 2025-01-05

## 2025-01-05 RX ORDER — MORPHINE SULFATE 0.5 MG/ML
INJECTION, SOLUTION EPIDURAL; INTRATHECAL; INTRAVENOUS AS NEEDED
Status: DISCONTINUED | OUTPATIENT
Start: 2025-01-05 | End: 2025-01-05

## 2025-01-05 RX ORDER — PROMETHAZINE HYDROCHLORIDE 25 MG/ML
INJECTION, SOLUTION INTRAMUSCULAR; INTRAVENOUS AS NEEDED
Status: DISCONTINUED | OUTPATIENT
Start: 2025-01-05 | End: 2025-01-05

## 2025-01-05 RX ORDER — DIPHENHYDRAMINE HCL 25 MG
25 TABLET ORAL EVERY 6 HOURS PRN
Status: DISCONTINUED | OUTPATIENT
Start: 2025-01-05 | End: 2025-01-08 | Stop reason: HOSPADM

## 2025-01-05 RX ORDER — FENTANYL CITRATE 50 UG/ML
INJECTION, SOLUTION INTRAMUSCULAR; INTRAVENOUS AS NEEDED
Status: DISCONTINUED | OUTPATIENT
Start: 2025-01-05 | End: 2025-01-05

## 2025-01-05 RX ORDER — OXYTOCIN/RINGER'S LACTATE 30/500 ML
PLASTIC BAG, INJECTION (ML) INTRAVENOUS
Status: COMPLETED
Start: 2025-01-05 | End: 2025-01-05

## 2025-01-05 RX ORDER — ONDANSETRON 2 MG/ML
INJECTION INTRAMUSCULAR; INTRAVENOUS
Status: COMPLETED
Start: 2025-01-05 | End: 2025-01-05

## 2025-01-05 RX ORDER — TRANEXAMIC ACID 100 MG/ML
INJECTION, SOLUTION INTRAVENOUS AS NEEDED
Status: DISCONTINUED | OUTPATIENT
Start: 2025-01-05 | End: 2025-01-05

## 2025-01-05 RX ORDER — ENOXAPARIN SODIUM 100 MG/ML
40 INJECTION SUBCUTANEOUS
Status: DISCONTINUED | OUTPATIENT
Start: 2025-01-05 | End: 2025-01-05

## 2025-01-05 RX ORDER — ONDANSETRON 2 MG/ML
4 INJECTION INTRAMUSCULAR; INTRAVENOUS EVERY 6 HOURS PRN
Status: ACTIVE | OUTPATIENT
Start: 2025-01-05 | End: 2025-01-06

## 2025-01-05 RX ORDER — EPHEDRINE SULFATE 50 MG/ML
INJECTION INTRAVENOUS AS NEEDED
Status: DISCONTINUED | OUTPATIENT
Start: 2025-01-05 | End: 2025-01-05

## 2025-01-05 RX ORDER — NALOXONE HYDROCHLORIDE 0.4 MG/ML
0.1 INJECTION, SOLUTION INTRAMUSCULAR; INTRAVENOUS; SUBCUTANEOUS
Status: ACTIVE | OUTPATIENT
Start: 2025-01-05 | End: 2025-01-06

## 2025-01-05 RX ORDER — CALCIUM CARBONATE 500 MG/1
1000 TABLET, CHEWABLE ORAL DAILY PRN
Status: DISCONTINUED | OUTPATIENT
Start: 2025-01-05 | End: 2025-01-08 | Stop reason: HOSPADM

## 2025-01-05 RX ORDER — KETOROLAC TROMETHAMINE 30 MG/ML
INJECTION, SOLUTION INTRAMUSCULAR; INTRAVENOUS
Status: COMPLETED
Start: 2025-01-05 | End: 2025-01-05

## 2025-01-05 RX ORDER — EPHEDRINE SULFATE 50 MG/ML
INJECTION INTRAVENOUS
Status: COMPLETED
Start: 2025-01-05 | End: 2025-01-05

## 2025-01-05 RX ORDER — IBUPROFEN 600 MG/1
600 TABLET, FILM COATED ORAL EVERY 6 HOURS
Status: DISCONTINUED | OUTPATIENT
Start: 2025-01-06 | End: 2025-01-08 | Stop reason: HOSPADM

## 2025-01-05 RX ORDER — MORPHINE SULFATE 0.5 MG/ML
INJECTION, SOLUTION EPIDURAL; INTRATHECAL; INTRAVENOUS
Status: COMPLETED
Start: 2025-01-05 | End: 2025-01-05

## 2025-01-05 RX ORDER — DOCUSATE SODIUM 100 MG/1
100 CAPSULE, LIQUID FILLED ORAL 2 TIMES DAILY
Status: DISCONTINUED | OUTPATIENT
Start: 2025-01-05 | End: 2025-01-08 | Stop reason: HOSPADM

## 2025-01-05 RX ORDER — ONDANSETRON 2 MG/ML
4 INJECTION INTRAMUSCULAR; INTRAVENOUS EVERY 8 HOURS PRN
Status: DISCONTINUED | OUTPATIENT
Start: 2025-01-05 | End: 2025-01-08 | Stop reason: HOSPADM

## 2025-01-05 RX ORDER — OXYTOCIN/RINGER'S LACTATE 30/500 ML
62.5 PLASTIC BAG, INJECTION (ML) INTRAVENOUS ONCE
Status: COMPLETED | OUTPATIENT
Start: 2025-01-05 | End: 2025-01-05

## 2025-01-05 RX ORDER — FENTANYL CITRATE 50 UG/ML
INJECTION, SOLUTION INTRAMUSCULAR; INTRAVENOUS
Status: COMPLETED
Start: 2025-01-05 | End: 2025-01-05

## 2025-01-05 RX ORDER — CARBOPROST TROMETHAMINE 250 UG/ML
INJECTION, SOLUTION INTRAMUSCULAR
Status: COMPLETED
Start: 2025-01-05 | End: 2025-01-05

## 2025-01-05 RX ORDER — SODIUM CHLORIDE, SODIUM LACTATE, POTASSIUM CHLORIDE, CALCIUM CHLORIDE 600; 310; 30; 20 MG/100ML; MG/100ML; MG/100ML; MG/100ML
125 INJECTION, SOLUTION INTRAVENOUS CONTINUOUS
Status: DISCONTINUED | OUTPATIENT
Start: 2025-01-05 | End: 2025-01-08 | Stop reason: HOSPADM

## 2025-01-05 RX ORDER — DIPHENHYDRAMINE HYDROCHLORIDE 50 MG/ML
25 INJECTION INTRAMUSCULAR; INTRAVENOUS EVERY 6 HOURS PRN
Status: CANCELLED | OUTPATIENT
Start: 2025-01-05 | End: 2025-01-06

## 2025-01-05 RX ORDER — BENZOCAINE/MENTHOL 6 MG-10 MG
1 LOZENGE MUCOUS MEMBRANE DAILY PRN
Status: DISCONTINUED | OUTPATIENT
Start: 2025-01-05 | End: 2025-01-08 | Stop reason: HOSPADM

## 2025-01-05 RX ORDER — OXYCODONE HYDROCHLORIDE 5 MG/1
5 TABLET ORAL EVERY 4 HOURS PRN
Refills: 0 | Status: DISCONTINUED | OUTPATIENT
Start: 2025-01-05 | End: 2025-01-08 | Stop reason: HOSPADM

## 2025-01-05 RX ORDER — ONDANSETRON 2 MG/ML
INJECTION INTRAMUSCULAR; INTRAVENOUS AS NEEDED
Status: DISCONTINUED | OUTPATIENT
Start: 2025-01-05 | End: 2025-01-05

## 2025-01-05 RX ORDER — TRANEXAMIC ACID 100 MG/ML
INJECTION, SOLUTION INTRAVENOUS
Status: COMPLETED
Start: 2025-01-05 | End: 2025-01-05

## 2025-01-05 RX ADMIN — CEFAZOLIN SODIUM 2000 MG: 2 SOLUTION INTRAVENOUS at 09:54

## 2025-01-05 RX ADMIN — METHYLERGONOVINE MALEATE 0.2 MG: 0.2 INJECTION, SOLUTION INTRAMUSCULAR; INTRAVENOUS at 10:33

## 2025-01-05 RX ADMIN — Medication 2.5 MILLION UNITS: at 06:48

## 2025-01-05 RX ADMIN — SODIUM CHLORIDE, SODIUM LACTATE, POTASSIUM CHLORIDE, AND CALCIUM CHLORIDE 125 ML/HR: .6; .31; .03; .02 INJECTION, SOLUTION INTRAVENOUS at 12:56

## 2025-01-05 RX ADMIN — CARBOPROST TROMETHAMINE 250 MCG: 250 INJECTION, SOLUTION INTRAMUSCULAR at 11:10

## 2025-01-05 RX ADMIN — AZITHROMYCIN MONOHYDRATE 500 MG: 500 INJECTION, POWDER, LYOPHILIZED, FOR SOLUTION INTRAVENOUS at 09:58

## 2025-01-05 RX ADMIN — Medication 62.5 MILLI-UNITS/MIN: at 12:06

## 2025-01-05 RX ADMIN — SODIUM CHLORIDE, SODIUM LACTATE, POTASSIUM CHLORIDE, AND CALCIUM CHLORIDE 125 ML/HR: .6; .31; .03; .02 INJECTION, SOLUTION INTRAVENOUS at 09:07

## 2025-01-05 RX ADMIN — Medication 2.5 MILLION UNITS: at 02:48

## 2025-01-05 RX ADMIN — SODIUM CHLORIDE, SODIUM LACTATE, POTASSIUM CHLORIDE, AND CALCIUM CHLORIDE 999 ML/HR: .6; .31; .03; .02 INJECTION, SOLUTION INTRAVENOUS at 06:32

## 2025-01-05 RX ADMIN — BUPIVACAINE HYDROCHLORIDE IN DEXTROSE 1.5 ML: 7.5 INJECTION, SOLUTION SUBARACHNOID at 10:09

## 2025-01-05 RX ADMIN — FENTANYL CITRATE 15 MCG: 50 INJECTION INTRAMUSCULAR; INTRAVENOUS at 10:09

## 2025-01-05 RX ADMIN — PROMETHAZINE HYDROCHLORIDE 6.25 MG: 25 INJECTION INTRAMUSCULAR; INTRAVENOUS at 10:31

## 2025-01-05 RX ADMIN — KETOROLAC TROMETHAMINE 15 MG: 30 INJECTION, SOLUTION INTRAMUSCULAR; INTRAVENOUS at 23:56

## 2025-01-05 RX ADMIN — KETOROLAC TROMETHAMINE 30 MG: 30 INJECTION, SOLUTION INTRAMUSCULAR; INTRAVENOUS at 10:53

## 2025-01-05 RX ADMIN — ACETAMINOPHEN 975 MG: 325 TABLET, FILM COATED ORAL at 13:46

## 2025-01-05 RX ADMIN — MORPHINE SULFATE 0.15 MG: 0.5 INJECTION, SOLUTION EPIDURAL; INTRATHECAL; INTRAVENOUS at 10:09

## 2025-01-05 RX ADMIN — PROMETHAZINE HYDROCHLORIDE 6.25 MG: 25 INJECTION INTRAMUSCULAR; INTRAVENOUS at 10:48

## 2025-01-05 RX ADMIN — SODIUM CHLORIDE, SODIUM LACTATE, POTASSIUM CHLORIDE, AND CALCIUM CHLORIDE 125 ML/HR: .6; .31; .03; .02 INJECTION, SOLUTION INTRAVENOUS at 21:28

## 2025-01-05 RX ADMIN — DOCUSATE SODIUM 100 MG: 100 CAPSULE, LIQUID FILLED ORAL at 17:13

## 2025-01-05 RX ADMIN — EPHEDRINE SULFATE 10 MG: 50 INJECTION INTRAVENOUS at 10:17

## 2025-01-05 RX ADMIN — TRANEXAMIC ACID 1 G: 100 INJECTION, SOLUTION INTRAVENOUS at 10:41

## 2025-01-05 RX ADMIN — ONDANSETRON 4 MG: 2 INJECTION INTRAMUSCULAR; INTRAVENOUS at 10:10

## 2025-01-05 RX ADMIN — ACETAMINOPHEN 975 MG: 325 TABLET, FILM COATED ORAL at 21:21

## 2025-01-05 RX ADMIN — PHENYLEPHRINE HYDROCHLORIDE 50 MCG/MIN: 50 INJECTION INTRAVENOUS at 10:16

## 2025-01-05 RX ADMIN — KETOROLAC TROMETHAMINE 15 MG: 30 INJECTION, SOLUTION INTRAMUSCULAR; INTRAVENOUS at 17:12

## 2025-01-05 RX ADMIN — Medication 250 MILLI-UNITS/MIN: at 10:57

## 2025-01-05 RX ADMIN — SODIUM CHLORIDE, SODIUM LACTATE, POTASSIUM CHLORIDE, AND CALCIUM CHLORIDE 999 ML/HR: .6; .31; .03; .02 INJECTION, SOLUTION INTRAVENOUS at 12:05

## 2025-01-05 RX ADMIN — SODIUM CHLORIDE, SODIUM LACTATE, POTASSIUM CHLORIDE, AND CALCIUM CHLORIDE: .6; .31; .03; .02 INJECTION, SOLUTION INTRAVENOUS at 11:07

## 2025-01-05 RX ADMIN — CEFAZOLIN SODIUM 2000 MG: 2 SOLUTION INTRAVENOUS at 10:13

## 2025-01-05 NOTE — CASE MANAGEMENT
Case Management Progress Note    Patient name Johnathon English  Location /-01 MRN [id number]  : 1990 Date 2025       LOS (days): 2  Geometric Mean LOS (GMLOS) (days):   Days to GMLOS:        OBJECTIVE:        Current admission status: Inpatient  Preferred Pharmacy:   [id number] - [place], [place] - [address]  [address] [address]  Phone: [phone] Fax: [phone]    Primary Care Provider: Bryon Rae MD    Primary Insurance: BLUE CROSS  Secondary Insurance:     PROGRESS NOTE:    Consult(s): to CM for SDOH regarding having transportation for Doctor's visits, getting medications, paying for rent and wanting help with these problems.     The information listed below was provided by MOB using her translation application on her phone. The AptaraN Tablet was not available at time of this encounter.       MOB reports that SHYAM is in transit now, from [place]. He will be living w/ MOB, her cousin and Baby. Per MOB, he plans to get work here to help support Baby and MOB.     MOB reports she has nothing for Baby and will need connection to community outreach in the [facility]. MOB plans to continue living with her cousin for the foreseeable future.     CM asked about Health insurance, MOB denied having any, however her profile lists a [place] [employer] policy that is active and has been verified.       CM met w/MOB who provided the following information:      Baby's name/gender: Ranulfo/Boy    Mother of baby: Johnathon     Father of baby//SO: Abebe    Other Legal Guardian(s) for baby:     Alternate emergency contact: Cousin    Other children: 3 yo Daughter    Lives with: Cousin    Support System: Cousin, SO (FOB)    Baby Supplies:  Needs everything, has some clothing    Method of feeding: Both Bottle & Breast    Breast Pump if breast feeding: Needs a pump, does not know which to choose.     Government Assistance Programs/WIC/EBT/SSI:   NEEDS THESE RESOURCES    Work/School:  Not working    Transportation: Cousin as able    Prenatal care:   [facility] [place] ([address] [place])    Pediatrician:  [facility] ([address] [place])    Mental Health Hx or treatment:   Substance Abuse hx or treatment:   Hx DV/IPV:   Legal (probation/parole/incarceration):   Community Referrals/C&Y/NFP/shelter:      Insurance for baby: Will acquire w/ assistance from [facility] in [place].

## 2025-01-05 NOTE — OB LABOR/OXYTOCIN SAFETY PROGRESS
Oxytocin Safety Progress Check Note - Johnathon English 34 y.o. female MRN: [id number]    Unit/Bed#: -01 Encounter: [id number]    Dose (arnel-units/min) Oxytocin: 0 arnel-units/min  Contraction Frequency (minutes): 2-4  Contraction Intensity: Mild/Moderate  Uterine Activity Characteristics: Irregular  Cervical Dilation: 2        Cervical Effacement: 70  Fetal Station: -3  Baseline Rate (FHR): 120 bpm  Fetal Heart Rate (FHT): 120 BPM  FHR Category: 1               Vital Signs:   Vitals:    25 0621   BP:    Pulse: 80   Resp:    Temp:    SpO2: 100%       Notes/comments:   FHT now category 1 and reactive after deceleration/difficulty finding FHR. Pitocin is still off. Discussed with patient the lack of cervical change since starting her induction. Discussed with patient that while the FHT is currently category 1 and we could re-start pitocin to continue her induction, I'm not sure if it will change the eventual outcome of a  section at this point. Patient states that she does not desire to re-start pitocin at this point, and would prefer to proceed with a  section at this time. Discussed with patient the risks of  section, including bleeding, infection, and injury to surrounding organs. Ancef and Azithromycin ordered, Anesthesiologist notified.     Alicia Mcdowell MD 2025 6:56 AM

## 2025-01-05 NOTE — OB LABOR/OXYTOCIN SAFETY PROGRESS
Oxytocin Safety Progress Check Note - Johnathon English 34 y.o. female MRN: [id number]    Unit/Bed#: -01 Encounter: [id number]    Dose (arnel-units/min) Oxytocin: 0 arnel-units/min (Per Dr. Espinoza)  Contraction Frequency (minutes): irritability  Contraction Intensity: Mild/Moderate  Uterine Activity Characteristics: Irritability  Cervical Dilation: 2        Cervical Effacement: 70  Fetal Station: -3  Baseline Rate (FHR): 140 bpm  Fetal Heart Rate (FHT): 120 BPM  FHR Category: 1               Vital Signs:   Vitals:    25   BP:    Pulse:    Resp:    Temp: 98.6 °F (37 °C)       Notes/comments:   SVE as above  Patietn still motivated for   Will give 2nd dose of cytotec       Charley Monreal MD 2025 9:12 PM

## 2025-01-05 NOTE — OB LABOR/OXYTOCIN SAFETY PROGRESS
Labor Progress Note - Johnathon English 34 y.o. female MRN: [id number]    Unit/Bed#: -01 Encounter: [id number]    Dose (arnel-units/min) Oxytocin: 0 arnel-units/min (Per Dr. Espinoza)  Contraction Frequency (minutes): 1.5-7  Contraction Intensity: Mild/Moderate  Uterine Activity Characteristics: Irregular  Cervical Dilation: 2        Cervical Effacement: 70  Fetal Station: -3  Baseline Rate (FHR): 130 bpm  Fetal Heart Rate (FHT): 130 BPM  FHR Category: 1               Vital Signs:   Vitals:    01/05/25 0100   BP: 125/65   Pulse:    Resp:    Temp:        Notes/comments:   SVE as above s/p 2 doses of cytotec.   Will restart pitocin      Charley Monreal MD 1/5/2025 2:33 AM

## 2025-01-05 NOTE — QUICK NOTE
Took over patients care at 0800.  was recommended by Dr. Mcdowell due to failed induction, prolonged pre labor rupture of membranes. Patient agreeable to procedure. All questions answered.

## 2025-01-05 NOTE — OB LABOR/OXYTOCIN SAFETY PROGRESS
Oxytocin Safety Progress Check Note - Johnathon English 34 y.o. female MRN: [id number]    Unit/Bed#: -01 Encounter: [id number]    Dose (arnel-units/min) Oxytocin: 0 arnel-units/min  Contraction Frequency (minutes): 2  Contraction Intensity: Mild/Moderate  Uterine Activity Characteristics: Irregular  Cervical Dilation: 2        Cervical Effacement: 70  Fetal Station: -3  Baseline Rate (FHR): 135 bpm  Fetal Heart Rate (FHT): 120 BPM  FHR Category: 2               Vital Signs:   Vitals:    25 0621   BP:    Pulse: 80   Resp:    Temp:    SpO2: 100%       Notes/comments:   Pitocin off for 3 minute deceleration which recovered with repositioning. FSE placed for difficulty tracing. Discussed that patient already met criteria for failed induction. She consents to  section at this time. Discussed risks of  including bleeding, infection injury to surrounding organs, need for blood transfusion      Julieta Gao MD 2025 6:46 AM

## 2025-01-05 NOTE — ANESTHESIA PROCEDURE NOTES
Spinal Block    Patient location during procedure: OB/L&D  Start time: 1/5/2025 10:09 AM  Reason for block: primary anesthetic  Staffing  Performed by: Nikolai Umaña DO  Authorized by: Nikolai Umaña DO    Preanesthetic Checklist  Completed: patient identified, IV checked, site marked, risks and benefits discussed, surgical consent, monitors and equipment checked, pre-op evaluation and timeout performed  Spinal Block  Patient position: sitting  Prep: Betadine  Patient monitoring: heart rate, continuous pulse ox and frequent blood pressure checks  Approach: midline  Location: L3-4  Needle  Needle type: Pencan   Needle gauge: 24 G  Needle length: 3.5 in  Assessment  Injection Assessment:  positive aspiration for clear CSF, no paresthesia on injection and negative aspiration for heme.  Post-procedure:  site cleaned

## 2025-01-05 NOTE — DISCHARGE SUMMARY
Discharge Summary - Johnathon English 34 y.o. female MRN: [id number]    Unit/Bed#: -01 Encounter: [id number]    ADMISSION  Admission Date: 1/3/2025   Admitting Attending: Dr. Whitney Flores MD  Admitting Diagnoses:   Patient Active Problem List   Diagnosis    Overweight    S/P  section    Obesity in pregnancy, antepartum    Abnormal glucose tolerance test (GTT) during pregnancy, antepartum    Positive GBS test    Uterine contractions    Premature rupture of membranes    Postpartum hemorrhage       DELIVERY  Delivery Method: , Low Transverse 1LTCS  Delivery Date and Time: 2025 10:28 AM  Delivery Attending: Whitney Flores    DISCHARGE  Discharge Date: 25  Discharge Attending: Dr. Flores  Discharge Diagnosis:   Same, Delivered    Clinical course: Admission to Delivery  Johnathon English is a 34 y.o.  at 40w0d admitted for premature rupture of membranes.  She was started on Cytotec and after 4 hours unchanged she was started on Pitocin because she was curt too much for Cytotec.  After 4 hours with minimal change of the cervix from 1 to 2 cm, IUPC was placed and subsequently fell out.  She was unchanged for 12 more hours and Pitocin was then turned off and the patient received a second dose of Cytotec as her contractions had spaced out.  She was unchanged on next check and patient was counseled extensively on recommendation for proceeding with  section as patient met criteria for failed induction with prolonged rupture of membranes.  However, patient was insistent on continuing the induction.  She then received a third dose of Cytotec and continued to remain at 2 cm.  Pitocin was then restarted at 3 AM and subsequently had a 3-minute deceleration at 6 AM and Pitocin was turned off.  Patient was counseled on recommendation for  section given now fetal intolerance to Pitocin and prolonged rupture of membranes and failed induction of labor.   Patient consented.  Owing to high acuity of labor patient on the floor, there was delay in proceeding to the operating room.      Delivery  Route of Delivery: , Low Transverse  Reason for  delivery: Other (Add Comments) failed induction    Anesthesia: Spinal,   QBL:  1859        Delivery: , Low Transverse at 2025 10:28 AM    Baby's Weight: 3860 g (8 lb 8.2 oz); 136.16    Apgar scores: 8  and 9  at 1 and 5 minutes, respectively      Clinical Course: Post-Delivery:  The post delivery course was unremarkable.    On the day of discharge, the patient was ambulating, voiding spontaneously, tolerating oral intake, and hemodynamically stable. She was able to reasonably perform all ADLs. She had appropriate bowel function. Pain was well-controlled. She was discharged home on postpartum/postop day #3 without complications. Patient was instructed to follow up with her OB as an outpatient and was given appropriate warnings to call her provider with problems or concerns.    Pertinent lab findings included:     Last three Hgb values:  Lab Results   Component Value Date    HGB 8.5 (L) 2025    HGB 6.9 (L) 2025    HGB 7.5 (L) 2025        Problem-specific follow-up plans included the following:  Problem List          Unprioritized    Overweight    * (Principal) S/P  section    Obesity in pregnancy, antepartum    Abnormal glucose tolerance test (GTT) during pregnancy, antepartum    Overview   Early ; 3 hour WNL         Positive GBS test    Uterine contractions    Premature rupture of membranes    Postpartum hemorrhage     Other Visit Diagnoses         Postpartum care and examination of lactating mother    -  Primary      Encounter for screening involving social determinants of health (SDoH)                 Discharge med list:  Contraception:  condoms     Medication List      START taking these medications     ibuprofen 600 mg tablet; Commonly known as: MOTRIN; Take 1  tablet (600   mg total) by mouth every 6 (six) hours   naloxone 4 mg/0.1 mL nasal spray; Commonly known as: NARCAN; Administer   1 spray into a nostril. If no response after 2-3 minutes, give another   dose in the other nostril using a new spray.   oxyCODONE 5 immediate release tablet; Commonly known as: ROXICODONE;   Take 1 tablet (5 mg total) by mouth every 4 (four) hours as needed for   moderate pain for up to 5 doses Max Daily Amount: 30 mg   senna 8.6 mg; Commonly known as: SENOKOT; Take 1 tablet (8.6 mg total)   by mouth daily as needed for constipation     CONTINUE taking these medications     acetaminophen 650 mg CR tablet; Commonly known as: TYLENOL; Take 1   tablet (650 mg total) by mouth every 8 (eight) hours as needed for mild   pain   PRENATAL VITAMIN PO       Condition at discharge:   good     Disposition:   See After Visit Summary for discharge disposition information.    Planned Readmission:   No    ROCKY Boles

## 2025-01-05 NOTE — ANESTHESIA POSTPROCEDURE EVALUATION
Post-Op Assessment Note    CV Status:  Stable    Pain management: adequate       Mental Status:  Alert and awake   Hydration Status:  Euvolemic   PONV Controlled:  Controlled   Airway Patency:  Patent     Post Op Vitals Reviewed: Yes    No anethesia notable event occurred.    Staff: Anesthesiologist           Last Filed PACU Vitals:  Vitals Value Taken Time   Temp 97.5 °F (36.4 °C) 01/05/25 1140   Pulse 104 01/05/25 1150   /46 01/05/25 1200   Resp 18 01/05/25 1150   SpO2 100 % 01/05/25 1150       Modified Mihir:     Vitals Value Taken Time   Activity 1 01/05/25 1150   Respiration 2 01/05/25 1150   Circulation 2 01/05/25 1150   Consciousness 2 01/05/25 1150   Oxygen Saturation 2 01/05/25 1150     Modified Mihir Score: 9

## 2025-01-05 NOTE — PLAN OF CARE

## 2025-01-05 NOTE — ANESTHESIA PREPROCEDURE EVALUATION
Procedure:   SECTION () (Uterus)    Relevant Problems   ANESTHESIA (within normal limits)      CARDIO (within normal limits)      /RENAL (within normal limits)      GYN   (+) 39 weeks gestation of pregnancy      MUSCULOSKELETAL (within normal limits)      PULMONARY (within normal limits)        Physical Exam    Airway    Mallampati score: II         Dental   No notable dental hx     Cardiovascular  Cardiovascular exam normal    Pulmonary  Pulmonary exam normal Breath sounds clear to auscultation    Other Findings  post-pubertal.      Anesthesia Plan  ASA Score- 2     Anesthesia Type- spinal with ASA Monitors.         Additional Monitors:     Airway Plan:            Plan Factors-    Chart reviewed.   Existing labs reviewed. Patient summary reviewed.    Patient is not a current smoker.              Induction-     Postoperative Plan-     Perioperative Resuscitation Plan - Level 1 - Full Code.       Informed Consent- Anesthetic plan and risks discussed with patient.

## 2025-01-05 NOTE — OB LABOR/OXYTOCIN SAFETY PROGRESS
Oxytocin Safety Progress Check Note - Johnathon English 34 y.o. female MRN: [id number]    Unit/Bed#: -01 Encounter: [id number]    Dose (arnel-units/min) Oxytocin: 0 arnel-units/min  Contraction Frequency (minutes): 2-4  Contraction Intensity: Mild/Moderate  Uterine Activity Characteristics: Irregular  Cervical Dilation: 2        Cervical Effacement: 70  Fetal Station: -3  Baseline Rate (FHR): 120 bpm  Fetal Heart Rate (FHT): 120 BPM  FHR Category: 1               Vital Signs:   Vitals:    01/05/25 0621   BP:    Pulse: 80   Resp:    Temp:    SpO2: 100%       Notes/comments:   Patient requesting repeat SVE, still unchanged      Julieta Gao MD 1/5/2025 7:48 AM

## 2025-01-05 NOTE — OP NOTE
OPERATIVE REPORT  PATIENT NAME: Johnathon nEglish    :  1990  MRN: [id number]  Pt Location: [facility] OR ROOM 01    SURGERY DATE: 2025    Surgeons and Role:     * Whitney Flores MD - Primary     * Julieta Gao MD - Assisting    Preop Diagnosis:  Premature rupture of membranes  39 weeks gestation  Abnormal GTT  Positive GBS  Failed induction    Procedure(s) (LRB):   SECTION () (N/A)    Specimen(s):  ID Type Source Tests Collected by Time Destination   A :  Cord Blood Cord BLOOD GAS, VENOUS, CORD Alicia Mcdowell MD 2025 0822    B :  Cord Blood Cord BLOOD GAS, ARTERIAL, CORD (Canceled) Alicia Mcdowell MD 2025 0823    C :  Tissue (Placenta on Hold) OB Only Placenta PLACENTA IN STORAGE Whitney Flores MD 2025 1058        Surgical QBL:  Surgical QBL (mL): 658 mL      Drains:  Urethral Catheter Non-latex 16 Fr. (Active)   Site Assessment Clean;Skin intact 25 1040   Ibarra Care Done 25 1040   Collection Container Standard drainage bag 25 1040   Securement Method Securing device (Describe) 25 1040   Number of days: 0       Anesthesia Type:   Spinal    Operative Indications:  Failed induction  Prolonged rupture of membranes     Brief History  Johnathon English is a 34 y.o.  at 40w0d admitted for premature rupture of membranes.  She was started on Cytotec and after 4 hours unchanged she was started on Pitocin because she was curt too much for Cytotec.  After 4 hours with minimal change of the cervix from 1 to 2 cm, IUPC was placed and subsequently fell out.  She was unchanged for 12 more hours and Pitocin was then turned off and the patient received a second dose of Cytotec as her contractions had spaced out.  She was unchanged on next check and patient was counseled extensively on recommendation for proceeding with  section as patient met criteria for failed induction with prolonged rupture of membranes.  However, patient was  insistent on continuing the induction.  She then received a third dose of Cytotec and continued to remain at 2 cm.  Pitocin was then restarted at 3 AM and subsequently had a 3-minute deceleration at 6 AM and Pitocin was turned off.  Patient was counseled on recommendation for  section given now fetal intolerance to Pitocin and prolonged rupture of membranes and failed induction of labor.  Patient consented.  Owing to high acuity of labor patient on the floor, there was delay in proceeding to the operating room.    Operative Findings:  1. Viable male  at 40w0d with APGARs of 8 and 9 at 1 and 5 minutes. Fetus weighted 8lb 8oz.  2. Clear amniotic fluid  3. Normal intact placenta with centrally inserted 3VC.  4. Normal uterus, bilateral tubes and ovaries  5. Adhesions absent    Umbilical Cord Venous Blood Gas:  Results from last 7 days   Lab Units 25  0822   PH COV  7.326   PCO2 COV mm HG 42.3   HCO3 COV mmol/L 21.6   BASE EXC COV mmol/L -4.2*   O2 CT CD VB mL/dL 12.1   O2 HGB, VENOUS CORD % 60.0     Umbilical Cord Arterial Blood Gas:    Unable to be collected    Operative Technique  The patient was taken to the operating room. Spinal anesthesia was adequately established and 2g Ancef and 500mg Azithromycin was given for preoperative prophylaxis. The patient was then placed in the dorsal supine position with a left tilt of the hips. The patient was then prepped with chlorhexidine for vaginal prep with a ramirez catheter and chloraprep for abdominal prep and draped in the usual sterile fashion for a Pfannenstiel skin incision.      A time out was performed to confirm correct patient and correct procedure.       A Pfannenstiel incision was made and carried down through the underlying subcutaneous tissue to the fascia using a scalpel. Rectus fascia was then incised. We then proceeded in modified Marcelino-Kennedy fashion. All anatomic layers were well-demarcated. Fascia was grasped with Kochers and dissected  from the rectus muscles sharply using scissors. The rectus muscles were  and the peritoneum was identified, entered, and extended longitudinally with blunt dissection.     The Ronald-O retractor was inserted and a transverse incision was made in the lower uterine segment using a new surgical blade. The uterine incision was extended cephalad and caudal using blunt dissection. The amniotic sac was entered.    The surgeon's hand was placed into the uterine cavity. The fetal head was identified and elevated through the uterine incision with the assistance of fundal pressure. With gentle traction, the shoulder was delivered, followed by the rest of the fetal body. There was no nuchal cord noted. On delivery the cord was doubly clamped and cut after delayed cord clamping. The infant was then passed off the table to the awaiting  staff. The  was noted to cry spontaneously and moved all extremities. Venous and arterial blood gas, cord blood, and portion of cord was obtained for analysis and routine blood testing. The placenta delivery was then sent to storage. Placenta was noted to be intact with a centrally inserted three-vessel cord.  Oxytocin was administered by IV infusion to enhance uterine contraction. The uterus was exteriorized and cleared of all clots and remaining products of conception. For poor tone, methergine and TXA were given with pitocin wide open.    The uterine incision was re approximated using an 0 Vicryl in a running locked fashion. A second horizontal imbricating stitch with the same suture was applied. The uterine incision was examined and noted to be hemostatic. The posterior cul-de-sac was cleared of all clots and products of conception. The uterus was replaced into the abdomen and the pericolic gutters were cleared of all clots.  The uterine incision was once again reexamined and noted to be hemostatic. The fascia was re approximated using an 0 Vicryl in a running nonlocked  fashion. The subcutaneous tissue was irrigated and cleared of all clots and debris. Good hemostasis was noted with Bovie electrocautery. The subcutaneous tissue was reapproximated with 2-0 plain gut. The skin incision was closed using Stratafix with Benzoin and steristrips. Good hemostasis was noted. Patient tolerated the procedure well. All needle, sponge, and instrument counts were noted to be correct x 2 at the end of the procedure.  Patient was transferred to the recovery room in stable condition. Dr. Flores was present and participated in the entire surgery.    Patient Disposition:  PACU     SIGNATURE: Julieta Gao MD  DATE: January 5, 2025  TIME: 11:26 AM

## 2025-01-05 NOTE — PLAN OF CARE

## 2025-01-06 PROBLEM — Z98.891 S/P CESAREAN SECTION: Status: ACTIVE | Noted: 2024-08-20

## 2025-01-06 LAB
ERYTHROCYTE [DISTWIDTH] IN BLOOD BY AUTOMATED COUNT: 14.3 % (ref 11.6–15.1)
ERYTHROCYTE [DISTWIDTH] IN BLOOD BY AUTOMATED COUNT: 15.1 % (ref 11.6–15.1)
HCT VFR BLD AUTO: 21.1 % (ref 34.8–46.1)
HCT VFR BLD AUTO: 26 % (ref 34.8–46.1)
HGB BLD-MCNC: 6.9 G/DL (ref 11.5–15.4)
HGB BLD-MCNC: 8.5 G/DL (ref 11.5–15.4)
MCH RBC QN AUTO: 27.5 PG (ref 26.8–34.3)
MCH RBC QN AUTO: 27.6 PG (ref 26.8–34.3)
MCHC RBC AUTO-ENTMCNC: 32.7 G/DL (ref 31.4–37.4)
MCHC RBC AUTO-ENTMCNC: 32.7 G/DL (ref 31.4–37.4)
MCV RBC AUTO: 84 FL (ref 82–98)
MCV RBC AUTO: 84 FL (ref 82–98)
PLATELET # BLD AUTO: 141 THOUSANDS/UL (ref 149–390)
PLATELET # BLD AUTO: 173 THOUSANDS/UL (ref 149–390)
PMV BLD AUTO: 11.6 FL (ref 8.9–12.7)
PMV BLD AUTO: 11.8 FL (ref 8.9–12.7)
RBC # BLD AUTO: 2.5 MILLION/UL (ref 3.81–5.12)
RBC # BLD AUTO: 3.09 MILLION/UL (ref 3.81–5.12)
WBC # BLD AUTO: 8.11 THOUSAND/UL (ref 4.31–10.16)
WBC # BLD AUTO: 8.7 THOUSAND/UL (ref 4.31–10.16)

## 2025-01-06 PROCEDURE — 99024 POSTOP FOLLOW-UP VISIT: CPT | Performed by: OBSTETRICS & GYNECOLOGY

## 2025-01-06 PROCEDURE — 30233N1 TRANSFUSION OF NONAUTOLOGOUS RED BLOOD CELLS INTO PERIPHERAL VEIN, PERCUTANEOUS APPROACH: ICD-10-PCS | Performed by: OBSTETRICS & GYNECOLOGY

## 2025-01-06 PROCEDURE — 85027 COMPLETE CBC AUTOMATED: CPT

## 2025-01-06 PROCEDURE — 85027 COMPLETE CBC AUTOMATED: CPT | Performed by: OBSTETRICS & GYNECOLOGY

## 2025-01-06 PROCEDURE — P9040 RBC LEUKOREDUCED IRRADIATED: HCPCS

## 2025-01-06 RX ORDER — POLYETHYLENE GLYCOL 3350 17 G/17G
17 POWDER, FOR SOLUTION ORAL DAILY PRN
Status: DISCONTINUED | OUTPATIENT
Start: 2025-01-06 | End: 2025-01-08 | Stop reason: HOSPADM

## 2025-01-06 RX ADMIN — ACETAMINOPHEN 975 MG: 325 TABLET, FILM COATED ORAL at 21:35

## 2025-01-06 RX ADMIN — DOCUSATE SODIUM 100 MG: 100 CAPSULE, LIQUID FILLED ORAL at 17:10

## 2025-01-06 RX ADMIN — POLYETHYLENE GLYCOL 3350 17 G: 17 POWDER, FOR SOLUTION ORAL at 23:09

## 2025-01-06 RX ADMIN — OXYCODONE 10 MG: 5 TABLET ORAL at 17:28

## 2025-01-06 RX ADMIN — ACETAMINOPHEN 975 MG: 325 TABLET, FILM COATED ORAL at 03:22

## 2025-01-06 RX ADMIN — KETOROLAC TROMETHAMINE 15 MG: 30 INJECTION, SOLUTION INTRAMUSCULAR; INTRAVENOUS at 05:41

## 2025-01-06 RX ADMIN — IBUPROFEN 600 MG: 600 TABLET, FILM COATED ORAL at 11:35

## 2025-01-06 RX ADMIN — DOCUSATE SODIUM 100 MG: 100 CAPSULE, LIQUID FILLED ORAL at 09:22

## 2025-01-06 RX ADMIN — IBUPROFEN 600 MG: 600 TABLET, FILM COATED ORAL at 23:09

## 2025-01-06 RX ADMIN — MELATONIN 3 MG: 3 TAB ORAL at 21:35

## 2025-01-06 RX ADMIN — IBUPROFEN 600 MG: 600 TABLET, FILM COATED ORAL at 17:10

## 2025-01-06 RX ADMIN — ACETAMINOPHEN 975 MG: 325 TABLET, FILM COATED ORAL at 15:42

## 2025-01-06 RX ADMIN — ACETAMINOPHEN 975 MG: 325 TABLET, FILM COATED ORAL at 09:22

## 2025-01-06 NOTE — LACTATION NOTE
This note was copied from a baby's chart.  CONSULT - LACTATION  Baby Boy English (Micheila) 1 days male MRN: [id number]    [facility] NURSERY Room / Bed:  412(N)/ 412(N) Encounter: [id number]    Maternal Information     MOTHER:  Johnathon English  Maternal Age: 34 y.o.  OB History: # 1 - Date: 25, Sex: Male, Weight: 3860 g (8 lb 8.2 oz), GA: 40w0d, Type: , Low Transverse, Apgar1: 8, Apgar5: 9, Living: Living, Birth Comments: None   Previouse breast reduction surgery? No    Lactation history:   Has patient previously breast fed: No   How long had patient previously breast fed:     Previous breast feeding complications:       Past Surgical History:   Procedure Laterality Date    LIPOMA RESECTION      located on her back    [place]  DELIVERY ONLY N/A 2025    Procedure:  SECTION ();  Surgeon: Alicia Mcdowell MD;  [facility];  Service: Obstetrics       Birth information:  YOB: 2024   Time of birth: 10:28 AM   Sex: male   Delivery type: , Low Transverse   Birth Weight: 3860 g (8 lb 8.2 oz)   Percent of Weight Change: -3%     Gestational Age: 40w0d   [unfilled]    Assessment     Breast and nipple assessment: normal assessment, patient unable to tolerate hand expression, not effective for drops    [place] Assessment:  Not assessed, sleeping post bottle feed    Feeding assessment: feeding well with formula, no latch assessed. Patient reports that baby latched after delivery and attempts every feed.    Feeding recommendations:  breast feed on demand, continue to attempt to breastfeed on demand. Paced bottle feed formula     25 1220   Lactation Consultation   Reason for Consult 20;10 mn   Risk Factors Language barrier   Maternal Information   Has mother  before? No   Exclusive Pump and Bottle Feed No   Breasts/Nipples   Left Breast Soft   Right Breast Soft   Left Nipple Everted;Other  (Comment)  (Dimpled)   Right Nipple Everted;Other (Comment)  (Everted)   Breastfeeding Progress Not yet established   Reasons for not Breastfeeding Maternal preference  (Bottle feeding)   Other OB Lactation Tools   Feeding Devices Bottle   Patient Follow-Up   Lactation Consult Status 2   Follow-Up Type Inpatient;Call as needed   Other OB Lactation Documentation    Additional Problem Noted Initial: FOB translating. Pt believes has no milk. Baby has been receiving large amounts of formula. Pt reports to be attempting at breast first then supplementing. Education provided. Enc to call for next feed.  (Reviewed RSB and DC (Luxembourgish provided).)       Supportive partner translating for patient. Patient also understands some english.    Patient states to be bringing baby to breast and then topping off with formula. Patient believes she has no milk and is therefore supplementing until her milk transitions.    Educated on colostrum starting to be produced at 16 weeks. Discussed that colostrum is thick and sticky and comes in small amounts in the first few days. Reviewed that breast milk will start to transition day 3-5.  Performed hand expression with permission.    Discussed baby's belly size and the timeline of milk transitioning. Reviewed cluster feeding and baby's second day.    Demonstrated paced bottle feeding with family- encouraged to sit baby up in an upright position and to introduce the bottle at a horizontal level; allowing the baby to pace the feed. Educated on formula storage and to be used within 1 hour.    Reviewed proper alignment  and proper positioning with mother. Discussed to ensure that the baby is always facing mother and that baby is alignment- ears, shoulders and hips. Encouraged to support the base of the baby's neck, avoiding holding the back of the baby's head to allow the baby to move as they need to. Aligning baby's nose to the nipple and allowing for the baby to open wide, with the baby's chin  touching the breast first.     Feeding plan:  Patient is encouraged to attempt to breastfeed on demand at least 8-12 times in 24 hours and watch for early hunger cues. Discussed feeding cues vs fullness cues.  Paced bottle feed formula as patient desires.    Provided with [place] feeding log. Parents were encouraged to document baby's feedings and outputs to ensure baby is adequately feeding.    Discussed how to know the baby is feeding adequately at the breast:  -Baby feeding at least 8-12 feeds in 24 hours.  -Baby is deeply latched onto the breast, with lips flanged out. Actively sucking, swallows may not be audible.  -Mother feels a comfortable tugging sensation during a feeding.  -Baby is showing fullness cues post feed- content, arms relaxed and/or sleeping.  -Baby is having adequate amounts of diapers and meeting goal diapers.  -Baby's stool is changing from black meconium, to greenish brown to yellow and seedy looking over the first week when exclusively providing breast milk.   -Baby's weight loss is within normal ranges.     Discussed community resources for continued breastfeeding support once discharged home. Encouraged to contact with [facility] as needed.    Addressed breast pump for home use. CM consult placed for Avokiamee Z2 breast pump.     Encouraged patient to utilize nursing staff for support overnight as needed and to reach out to lactation for next feed.    Patient was encouraged to contact lactation for a latch assessment, continued support and/or questions that arise.    Clementina Smith 1/6/2025 12:55 PM

## 2025-01-06 NOTE — PROGRESS NOTES
Progress Note - OB/GYN   Name: Johnathon English 34 y.o. female I MRN: [id number]  Unit/Bed#: -01 I Date of Admission: 1/3/2025   Date of Service: 2025 I Hospital Day: 3     Assessment & Plan  S/P  section  Continue routine post partum care  QBL 1859, HgB 11.6 -> 7.5 -> 6.9, patient considering if she will accept 1u pRBC  Pain well controlled: tylenol/motrin scheduled, beverley prn  Lochia within normal limits: continue to monitor   OOB: as able, encourage ambulation  Passing flatus  Voiding spontaneously s/p void trial  DVT ppx: SCD, Lovenox 40mg to be ordered once HgB stabilizes  Encourage breastfeeding  Baby in: room  Dispo: anticipate d/c home POD2-4    Abnormal glucose tolerance test (GTT) during pregnancy, antepartum  Early ; 3 hour WNL   Postpartum hemorrhage  QBL 1859, HgB 11.6 -> 7.5 -> 6.9, patient considering 1u pRBC    OB Post-Partum Progress Note  Subjective   Post delivery. Patient is doing well. Lochia WNL. Pain well controlled. She reports that since 0400 this morning she has felt intermittently dizzy, worse when she is up and walking. She is otherwise without complaint and denies feelings of pre-syncope, palpitations, nausea/vomiting.     Pain: yes, cramping, improved with meds  Tolerating PO: yes  Voiding: yes  Flatus: yes  BM: not yet  Ambulating: yes  Breastfeeding:  yes  Chest pain: no  Shortness of breath: no  Leg pain: no  Lochia: WNL    Objective :  Temp:  [97.4 °F (36.3 °C)-98.7 °F (37.1 °C)] 97.5 °F (36.4 °C)  HR:  [] 86  BP: ()/(44-67) 97/64  Resp:  [16-22] 16  SpO2:  [98 %-100 %] 98 %  O2 Device: None (Room air)    Physical Exam  Constitutional:       Appearance: Normal appearance.   Cardiovascular:      Rate and Rhythm: Normal rate.   Abdominal:      Comments: Fundus firm, below umbilicus   Genitourinary:     Comments: Minimal lochia  Musculoskeletal:      Right lower leg: No edema.      Left lower leg: No edema.   Skin:     General: Skin is warm  and dry.   Neurological:      General: No focal deficit present.      Mental Status: She is alert and oriented to person, place, and time.           Lab Results: I have reviewed the following results:  Lab Results   Component Value Date    WBC 8.11 01/06/2025    HGB 6.9 (L) 01/06/2025    HCT 21.1 (L) 01/06/2025    MCV 84 01/06/2025     (L) 01/06/2025     Breanne Carty MD  OBGYN PGY2

## 2025-01-06 NOTE — PLAN OF CARE
Problem: POSTPARTUM  Goal: Experiences normal postpartum course  Description: INTERVENTIONS:  - Monitor maternal vital signs  - Assess uterine involution and lochia  Outcome: Progressing  Goal: Appropriate maternal -  bonding  Description: INTERVENTIONS:  - Identify family support  - Assess for appropriate maternal/infant bonding   -Encourage maternal/infant bonding opportunities  - Referral to  or  as needed  Outcome: Progressing  Goal: Establishment of infant feeding pattern  Description: INTERVENTIONS:  - Assess breast/bottle feeding  - Refer to lactation as needed  Outcome: Progressing  Goal: Incision(s), wounds(s) or drain site(s) healing without S/S of infection  Description: INTERVENTIONS  - Assess and document dressing, incision, wound bed, drain sites and surrounding tissue  - Provide patient and family education  Outcome: Progressing     Problem: INFECTION - ADULT  Goal: Absence or prevention of progression during hospitalization  Description: INTERVENTIONS:  - Assess and monitor for signs and symptoms of infection  - Monitor lab/diagnostic results  - Monitor all insertion sites, i.e. indwelling lines, tubes, and drains  - Monitor endotracheal if appropriate and nasal secretions for changes in amount and color  - Jackpot appropriate cooling/warming therapies per order  - Administer medications as ordered  - Instruct and encourage patient and family to use good hand hygiene technique  - Identify and instruct in appropriate isolation precautions for identified infection/condition  Outcome: Progressing  Goal: Absence of fever/infection during neutropenic period  Description: INTERVENTIONS:  - Monitor WBC    Outcome: Progressing

## 2025-01-06 NOTE — UTILIZATION REVIEW
Initial Clinical Review    Admission: Date/Time/Statement:   Admission Orders (From admission, onward)       Ordered        25 1629  Inpatient Admission  Once                          Orders Placed This Encounter   Procedures    Inpatient Admission     Standing Status:   Standing     Number of Occurrences:   1     Level of Care:   Med Surg [16]     Estimated length of stay:   More than 2 Midnights     Certification:   I certify that inpatient services are medically necessary for this patient for a duration of greater than two midnights. See H&P and MD Progress Notes for additional information about the patient's course of treatment.         Chief Complaint   Patient presents with    Scheduled Induction       Initial Presentation: 34 y.o. female presented to L&D as inpatient admission for  at 39w5d admitted for PROM. Plan continuous external monitoring, IVF,cytotec ramirez balloon, IV pitocin if needed Epidural and IV MGSO 4 if needed and supportive care   +ferning, +nitrazine, +pooling   Cytotec placed  SVE: /-4  FHT: cat 1    @ 2326  IV Pitocin started  Contraction Frequency (minutes): 1-4  Contraction Intensity: Mild/Moderate  Uterine Activity Characteristics: Regular  Cervical Dilation: 1  Cervical Effacement: 50  Fetal Station: -3  Baseline Rate (FHR): 145 bpm  Fetal Heart Rate (FHT): 154 BPM  FHR Category: 1     25  @ 0436  Dose (arnel-units/min) Oxytocin: 8 arnel-units/min  Contraction Frequency (minutes): 1-4  Contraction Intensity: Mild/Moderate  Uterine Activity Characteristics: Regular  Cervical Dilation: 2  Cervical Effacement: 70  Fetal Station: -3  Baseline Rate (FHR): 120 bpm  Fetal Heart Rate (FHT): 120 BPM  FHR Category: 1     @ 1539  SVE unchanged IV Pitocin off    PO Cytotec   Contraction Frequency (minutes): 1.5-2.5  Contraction Intensity: Mild/Moderate  Uterine Activity Characteristics: Regular  Cervical Dilation: 2  Cervical Effacement: 70  Fetal Station: -3  Baseline Rate  (FHR): 150 bpm  Fetal Heart Rate (FHT): 120 BPM  FHR Category: cat 1    @   PO cytotec  Dose (arnel-units/min) Oxytocin: 0 arnel-units/min (Per Dr. Espinoza)  Contraction Frequency (minutes): irritability  Contraction Intensity: Mild/Moderate  Uterine Activity Characteristics: Irritability  Cervical Dilation: 2  Cervical Effacement: 70  Fetal Station: -3  Baseline Rate (FHR): 140 bpm  Fetal Heart Rate (FHT): 120 BPM  FHR Category: 1    25  @ 0233  Restart IV pitocin  Dose (arnel-units/min) Oxytocin: 0 arnel-units/min (Per Dr. Espinoza)  Contraction Frequency (minutes): 1.5-7  Contraction Intensity: Mild/Moderate  Uterine Activity Characteristics: Irregular  Cervical Dilation: 2  Cervical Effacement: 70  Fetal Station: -3  Baseline Rate (FHR): 130 bpm  Fetal Heart Rate (FHT): 130 BPM  FHR Category: 1     @ 0646  Dose (arnel-units/min) Oxytocin: 0 arnel-units/min  Contraction Frequency (minutes): 2  Contraction Intensity: Mild/Moderate  Uterine Activity Characteristics: Irregular  Cervical Dilation: 2  Cervical Effacement: 70  Fetal Station: -3  Baseline Rate (FHR): 135 bpm  Fetal Heart Rate (FHT): 120 BPM  FHR Category: 2  3 minute deceleration     @ 0748  Dose (arnel-units/min) Oxytocin: 0 arnel-units/min  Contraction Frequency (minutes): 2-4  Contraction Intensity: Mild/Moderate  Uterine Activity Characteristics: Irregular  Cervical Dilation: 2  Cervical Effacement: 70  Fetal Station: -3  Baseline Rate (FHR): 120 bpm  Fetal Heart Rate (FHT): 120 BPM  FHR Category: 1      @ 1012  Epidural placed     @ 40 w  Male @ 1028  Apgar 8  9  Wt 3860 grams  Infant dried suctioned stimulated and warmed. Responded well and taken to NBN           Scheduled Medications:  acetaminophen, 975 mg, Oral, Q6H RANDY  docusate sodium, 100 mg, Oral, BID  [Held by provider] enoxaparin, 40 mg, Subcutaneous, Q24H RANDY  ibuprofen, 600 mg, Oral, Q6H  melatonin, 3 mg, Oral, HS      Continuous IV Infusions:  lactated ringers, 125 mL/hr,  Intravenous, Continuous      PRN Meds:  acetaminophen, 975 mg, Oral, Q6H PRN  benzocaine-menthol-lanolin-aloe, 1 Application, Topical, Q6H PRN  calcium carbonate, 1,000 mg, Oral, Daily PRN  diphenhydrAMINE, 25 mg, Oral, Q6H PRN  hydrocortisone, 1 Application, Topical, Daily PRN  ondansetron, 4 mg, Intravenous, Q8H PRN  oxyCODONE, 10 mg, Oral, Q4H PRN  oxyCODONE, 5 mg, Oral, Q4H PRN  witch hazel-glycerin, 1 Pad, Topical, Q4H PRN      Admitting  Vitals   Temperature Pulse Respirations Blood Pressure SpO2 Pain Score   01/03/25 1629 01/03/25 1639 01/03/25 1639 01/03/25 1639 01/05/25 0621 01/03/25 1639   (!) 97.1 °F (36.2 °C) 92 18 121/74 100 % No Pain     Weight (last 2 days)       None            Vital Signs (last 3 days)       Date/Time Temp Pulse Resp BP MAP (mmHg) SpO2 O2 Device Cardiac (WDL) Patient Position - Orthostatic VS Pain    01/06/25 0924 98.5 °F (36.9 °C) 86 18 108/60 -- 98 % -- -- -- --    01/06/25 0903 98 °F (36.7 °C) 80 16 114/60 -- 98 % -- -- -- --    01/06/25 0730 -- -- -- -- -- -- -- WDL -- 2    01/06/25 0700 98.4 °F (36.9 °C) 74 16 103/66 -- 100 % None (Room air) -- Lying --    01/06/25 0541 -- -- -- -- -- -- -- -- -- 4    01/06/25 0325 97.5 °F (36.4 °C) 86 16 97/64 -- 98 % None (Room air) -- Sitting --    01/06/25 0322 -- -- -- -- -- -- -- -- -- 3    01/05/25 1966 -- -- -- -- -- -- -- -- -- 2    01/05/25 2303 98.7 °F (37.1 °C) 82 18 101/64 71 98 % None (Room air) WDL Sitting --    01/05/25 2121 -- -- -- -- -- -- -- -- -- 3    01/05/25 1923 98.7 °F (37.1 °C) 85 18 97/59 72 98 % None (Room air) -- Sitting --    01/05/25 1712 -- -- -- -- -- -- -- -- -- 4    01/05/25 1600 98.1 °F (36.7 °C) 60 18 109/56 -- 100 % None (Room air) WDL -- No Pain    01/05/25 1449 98 °F (36.7 °C) 66 18 108/54 -- 100 % None (Room air) WDL -- No Pain    01/05/25 1356 97.5 °F (36.4 °C) 75 22 117/56 -- 100 % None (Room air) WDL -- No Pain    01/05/25 1346 -- -- -- -- -- -- -- -- -- 7    01/05/25 1253 97.5 °F (36.4 °C) 57 18  101/57 -- 100 % None (Room air) WDL -- No Pain    01/05/25 1229 97.4 °F (36.3 °C) 69 17 89/53 -- 100 % None (Room air) WDL Lying No Pain    01/05/25 1208 -- -- -- 94/52 -- -- -- -- Lying --    01/05/25 1200 -- -- -- 109/46 -- -- -- -- Lying --    01/05/25 1155 -- -- -- 80/44 -- -- -- -- -- --    01/05/25 1150 -- 104 18 93/51 -- 100 % None (Room air) WDL Lying No Pain    01/05/25 1140 97.5 °F (36.4 °C) 93 18 102/56 -- 100 % None (Room air) WDL Lying No Pain    01/05/25 1129 97.6 °F (36.4 °C) 73 18 115/61 -- 100 % None (Room air) WDL Lying No Pain    01/05/25 1120 97.6 °F (36.4 °C) 80 17 104/67 -- 100 % None (Room air) WDL Lying No Pain    01/05/25 0621 -- 80 -- -- -- 100 % -- -- -- --    01/05/25 0521 97.9 °F (36.6 °C) -- -- -- -- -- -- -- -- --    01/05/25 0413 98.4 °F (36.9 °C) 71 -- 106/57 -- -- -- -- -- --    01/05/25 0300 -- 70 -- 114/64 -- -- -- -- -- --    01/05/25 0200 98.5 °F (36.9 °C) -- -- -- -- -- -- -- -- --    01/05/25 0100 -- -- -- 125/65 -- -- -- -- -- --    01/05/25 0000 97.9 °F (36.6 °C) -- -- -- -- -- -- -- -- --    01/04/25 2240 -- 64 -- 109/66 -- -- -- -- -- --    01/04/25 2225 -- 71 -- 113/75 -- -- -- -- -- --    01/04/25 2210 -- 72 -- 107/51 -- -- -- -- -- --    01/04/25 2203 98.5 °F (36.9 °C) -- -- -- -- -- -- -- -- 3    01/04/25 2001 98.6 °F (37 °C) -- -- -- -- -- -- -- -- --    01/04/25 1828 98.7 °F (37.1 °C) -- -- -- -- -- -- -- -- --    01/04/25 1747 -- 66 -- 115/63 -- -- -- -- -- --    01/04/25 1730 98.4 °F (36.9 °C) -- -- -- -- -- -- -- -- --    01/04/25 1630 98.5 °F (36.9 °C) -- -- -- -- -- -- -- -- --    01/04/25 1447 -- 75 -- 121/57 -- -- -- -- -- --    01/04/25 1446 98.4 °F (36.9 °C) -- -- -- -- -- -- -- -- --    01/04/25 1256 -- -- -- -- -- -- -- -- -- 3    01/04/25 1248 98.1 °F (36.7 °C) 66 -- 118/58 -- -- -- -- -- --    01/04/25 1013 -- 67 -- 116/67 -- -- -- -- -- --    01/04/25 0922 98.6 °F (37 °C) 80 -- 110/67 -- -- -- -- -- --    01/04/25 0816 98.4 °F (36.9 °C) -- -- -- -- --  -- -- -- --    01/04/25 0650 98.3 °F (36.8 °C) -- -- -- -- -- -- -- -- --    01/04/25 0449 -- -- -- -- -- -- -- -- -- 4    01/04/25 0446 -- -- -- 121/64 -- -- -- -- -- --    01/04/25 0405 97.8 °F (36.6 °C) 74 -- 115/61 -- -- -- -- -- --    01/04/25 0227 98.2 °F (36.8 °C) 59 -- 112/58 -- -- -- -- -- --    01/03/25 2027 98.5 °F (36.9 °C) -- -- -- -- -- -- -- -- --    01/03/25 1639 -- 92 18 121/74 -- -- -- -- Lying No Pain    01/03/25 1629 97.1 °F (36.2 °C) -- -- -- -- -- -- -- -- --              Pertinent Labs/Diagnostic Test Results:         Results from last 7 days   Lab Units 01/06/25  0540 01/05/25  2219 01/03/25  1714   WBC Thousand/uL 8.11 8.97 6.55   HEMOGLOBIN g/dL 6.9* 7.5* 11.6   HEMATOCRIT % 21.1* 23.3* 35.7   PLATELETS Thousands/uL 141* 145* 197       Results from last 7 days   Lab Units 01/06/25  0838   UNIT PRODUCT CODE  [id number]  [id number]   UNIT NUMBER  [id number]  [id number]   UNITABO  B  B   UNITRH  POS  POS   CROSSMATCH  Compatible  Compatible   UNIT DISPENSE STATUS  Issued  Crossmatched   UNIT PRODUCT VOL mL 350  350       Past Medical History:   Diagnosis Date    History of chickenpox 02/2024     Present on Admission:   Abnormal glucose tolerance test (GTT) during pregnancy, antepartum   Positive GBS test      Admitting Diagnosis: Vaginal discharge during pregnancy [O26.899, N89.8]  Age/Sex: 34 y.o. female    Network Utilization Review Department  ATTENTION: Please call with any questions or concerns to [phone] and carefully listen to the prompts so that you are directed to the right person. All voicemails are confidential.   For Discharge needs, contact Care Management DC Support Team at [phone] opt. 2  Send all requests for admission clinical reviews, approved or denied determinations and any other requests to dedicated fax number below belonging to the campus where the patient is receiving treatment. List of dedicated fax numbers for the Facilities:  FACILITY NAME UR  FAX NUMBER   ADMISSION DENIALS (Administrative/Medical Necessity) [phone]   DISCHARGE SUPPORT TEAM (NETWORK) [phone]   PARENT CHILD HEALTH (Maternity/NICU/Pediatrics) [phone]   [facility] [phone]   [facility] [phone]   [facility] [phone]   [facility] [phone]   [facility] [phone]   [facility] [phone]   [facility] [phone]   [facility] [phone]   [facility] [phone]   [facility] [phone]   [facility] [phone]   [facility] [place] [phone]

## 2025-01-06 NOTE — CASE MANAGEMENT
Case Management Progress Note    Patient name Johnathon English  Location /-01 MRN [id number]  : 1990 Date 2025       LOS (days): 3  Geometric Mean LOS (GMLOS) (days):   Days to GMLOS:        OBJECTIVE:        Current admission status: Inpatient  Preferred Pharmacy:   [id number] - [address]  [address] [place] [id number]  Phone: [phone] Fax: [phone]    Primary Care Provider: Bryon Rae MD    Primary Insurance: BLUE CROSS  Secondary Insurance:     PROGRESS NOTE:    CM received consult for breast pump. MOB requesting Zomee Z2  pump. CM placed order via [employer]. Order approved. CM delivered pump to MOB's room. Delivery ticket signed and placed in bin for DME liaison.

## 2025-01-07 LAB
ABO GROUP BLD BPU: NORMAL
ABO GROUP BLD BPU: NORMAL
BPU ID: NORMAL
BPU ID: NORMAL
CROSSMATCH: NORMAL
CROSSMATCH: NORMAL
UNIT DISPENSE STATUS: NORMAL
UNIT DISPENSE STATUS: NORMAL
UNIT PRODUCT CODE: NORMAL
UNIT PRODUCT CODE: NORMAL
UNIT PRODUCT VOLUME: 350 ML
UNIT PRODUCT VOLUME: 350 ML
UNIT RH: NORMAL
UNIT RH: NORMAL

## 2025-01-07 PROCEDURE — 99024 POSTOP FOLLOW-UP VISIT: CPT | Performed by: OBSTETRICS & GYNECOLOGY

## 2025-01-07 RX ORDER — GINSENG 100 MG
1 CAPSULE ORAL 2 TIMES DAILY
Status: DISCONTINUED | OUTPATIENT
Start: 2025-01-07 | End: 2025-01-08 | Stop reason: HOSPADM

## 2025-01-07 RX ADMIN — DOCUSATE SODIUM 100 MG: 100 CAPSULE, LIQUID FILLED ORAL at 18:57

## 2025-01-07 RX ADMIN — IBUPROFEN 600 MG: 600 TABLET, FILM COATED ORAL at 23:06

## 2025-01-07 RX ADMIN — BACITRACIN ZINC 1 SMALL APPLICATION: 500 OINTMENT TOPICAL at 14:49

## 2025-01-07 RX ADMIN — DOCUSATE SODIUM 100 MG: 100 CAPSULE, LIQUID FILLED ORAL at 09:24

## 2025-01-07 RX ADMIN — BACITRACIN ZINC 1 SMALL APPLICATION: 500 OINTMENT TOPICAL at 21:45

## 2025-01-07 RX ADMIN — IBUPROFEN 600 MG: 600 TABLET, FILM COATED ORAL at 06:47

## 2025-01-07 RX ADMIN — IBUPROFEN 600 MG: 600 TABLET, FILM COATED ORAL at 14:47

## 2025-01-07 RX ADMIN — ACETAMINOPHEN 975 MG: 325 TABLET, FILM COATED ORAL at 21:39

## 2025-01-07 RX ADMIN — ACETAMINOPHEN 975 MG: 325 TABLET, FILM COATED ORAL at 16:08

## 2025-01-07 RX ADMIN — OXYCODONE 5 MG: 5 TABLET ORAL at 16:12

## 2025-01-07 RX ADMIN — ENOXAPARIN SODIUM 40 MG: 40 INJECTION SUBCUTANEOUS at 09:24

## 2025-01-07 RX ADMIN — OXYCODONE 5 MG: 5 TABLET ORAL at 09:24

## 2025-01-07 RX ADMIN — OXYCODONE 5 MG: 5 TABLET ORAL at 21:39

## 2025-01-07 RX ADMIN — MELATONIN 3 MG: 3 TAB ORAL at 21:39

## 2025-01-07 RX ADMIN — ACETAMINOPHEN 975 MG: 325 TABLET, FILM COATED ORAL at 03:52

## 2025-01-07 RX ADMIN — IRON SUCROSE 200 MG: 20 INJECTION, SOLUTION INTRAVENOUS at 09:30

## 2025-01-07 RX ADMIN — ACETAMINOPHEN 975 MG: 325 TABLET, FILM COATED ORAL at 09:24

## 2025-01-07 NOTE — QUICK NOTE
Patient reported blistering and skin separation from dressing tape on morning rounds. Pictures attached. She has been using Bacitracin ointment as instructed. She reports the blisters are painful but the pain is no worse than earlier this morning. Explained this is likely an allergic reaction to the tape. Advised to report if pain worsens.     Left side      Right side   (4) no limitation

## 2025-01-07 NOTE — PROGRESS NOTES
Patient and partner provided with discharge education packet. Save your life magnet and information on safe sleep practices provided to and discussed with patient. Patient and partner asked appropriate questions and verbalized understanding of teaching.

## 2025-01-07 NOTE — PLAN OF CARE
Problem: INFECTION - ADULT  Goal: Absence or prevention of progression during hospitalization  Description: INTERVENTIONS:  - Assess and monitor for signs and symptoms of infection  - Monitor lab/diagnostic results  - Monitor all insertion sites, i.e. indwelling lines, tubes, and drains  - Monitor endotracheal if appropriate and nasal secretions for changes in amount and color  - [place] appropriate cooling/warming therapies per order  - Administer medications as ordered  - Instruct and encourage patient and family to use good hand hygiene technique  - Identify and instruct in appropriate isolation precautions for identified infection/condition  Outcome: Progressing  Goal: Absence of fever/infection during neutropenic period  Description: INTERVENTIONS:  - Monitor WBC    Outcome: Progressing     Problem: POSTPARTUM  Goal: Experiences normal postpartum course  Description: INTERVENTIONS:  - Monitor maternal vital signs  - Assess uterine involution and lochia  Outcome: Progressing  Goal: Appropriate maternal -  bonding  Description: INTERVENTIONS:  - Identify family support  - Assess for appropriate maternal/infant bonding   -Encourage maternal/infant bonding opportunities  - Referral to  or  as needed  Outcome: Progressing  Goal: Establishment of infant feeding pattern  Description: INTERVENTIONS:  - Assess breast/bottle feeding  - Refer to lactation as needed  Outcome: Progressing  Goal: Incision(s), wounds(s) or drain site(s) healing without S/S of infection  Description: INTERVENTIONS  - Assess and document dressing, incision, wound bed, drain sites and surrounding tissue  - Provide patient and family education  Outcome: Progressing

## 2025-01-07 NOTE — ASSESSMENT & PLAN NOTE
Continue routine post partum care  QBL 1859, HgB 11.6 -> 7.5 -> 6.9 -> 1u pRBC -> 8.5  Pain well controlled: tylenol/motrin scheduled, beverley prn  Lochia within normal limits: continue to monitor   OOB: as able, encourage ambulation  Passing flatus  Voiding spontaneously s/p void trial  DVT ppx: SCD, Lovenox 40mg to be ordered once HgB stabilizes  Encourage breastfeeding  Baby in: room  Dispo: anticipate d/c home POD2-4

## 2025-01-07 NOTE — LACTATION NOTE
This note was copied from a baby's chart.     01/07/25 1130   Lactation Consultation   Reason for Consult 20 m   Risk Factors Language barrier   Maternal Information   Has mother  before? No   Exclusive Pump and Bottle Feed No   Breasts/Nipples   Left Breast Soft   Right Breast Soft   Left Nipple Flat;Everted;Blisters;Other (Comment)  (Compression Stripe on nipple)   Right Nipple Flat;Everted;Blisters;Other (Comment)  (Compression Stripe on aerola)   Intervention Lanolin   Breastfeeding Progress Not yet established   Other OB Lactation Tools   Feeding Devices Bottle   Patient Follow-Up   Lactation Consult Status 2   Follow-Up Type Inpatient;Call as needed   Other OB Lactation Documentation    Additional Problem Noted F/U: Pt states to be breastfeeding baby and then topping off with formula. Moist wound care reviewed. Enc to call for latch assessment.     Patient's partner present to translate during this encounter. Patient also understands some english.    Patient reports to be bringing baby to breast at every feed and then topping off with large volumes of formula. Patient presents with blisters on both nipples, she reports baby is latching well on left and is having difficulty latching at the right breast.    Patient is encouraged to pay close attention to latch and positioning. In addition, applying protective ointment after feeding and covering with an occlusive dressing.    Baby is post feed, 20 ml of formula. Encouraged to call lactation for next feed for latch assessment.

## 2025-01-07 NOTE — PLAN OF CARE
Problem: INFECTION - ADULT  Goal: Absence or prevention of progression during hospitalization  Description: INTERVENTIONS:  - Assess and monitor for signs and symptoms of infection  - Monitor lab/diagnostic results  - Monitor all insertion sites, i.e. indwelling lines, tubes, and drains  - Monitor endotracheal if appropriate and nasal secretions for changes in amount and color  - [place] appropriate cooling/warming therapies per order  - Administer medications as ordered  - Instruct and encourage patient and family to use good hand hygiene technique  - Identify and instruct in appropriate isolation precautions for identified infection/condition  Outcome: Progressing  Goal: Absence of fever/infection during neutropenic period  Description: INTERVENTIONS:  - Monitor WBC    Outcome: Progressing     Problem: POSTPARTUM  Goal: Experiences normal postpartum course  Description: INTERVENTIONS:  - Monitor maternal vital signs  - Assess uterine involution and lochia  Outcome: Progressing  Goal: Appropriate maternal -  bonding  Description: INTERVENTIONS:  - Identify family support  - Assess for appropriate maternal/infant bonding   -Encourage maternal/infant bonding opportunities  - Referral to  or  as needed  Outcome: Progressing  Goal: Establishment of infant feeding pattern  Description: INTERVENTIONS:  - Assess breast/bottle feeding  - Refer to lactation as needed  Outcome: Progressing  Goal: Incision(s), wounds(s) or drain site(s) healing without S/S of infection  Description: INTERVENTIONS  - Assess and document dressing, incision, wound bed, drain sites and surrounding tissue  - Provide patient and family education  - Perform skin care/dressing changes every  Outcome: Progressing

## 2025-01-07 NOTE — PROGRESS NOTES
Progress Note - OB/GYN   Name: Johnathon English 34 y.o. female I MRN: [id number]  Unit/Bed#: -01 I Date of Admission: 1/3/2025   Date of Service: 2025 I Hospital Day: 4     Assessment & Plan  S/P  section  Continue routine post partum care  QBL 1859, HgB 11.6 -> 7.5 -> 6.9 -> 1u pRBC -> 8.5  Pain well controlled: tylenol/motrin scheduled, beverley prn  Lochia within normal limits: continue to monitor   OOB: as able, encourage ambulation  Passing flatus  Voiding spontaneously s/p void trial  DVT ppx: SCD, Lovenox 40mg to be ordered once HgB stabilizes  Encourage breastfeeding  Baby in: room  Dispo: anticipate d/c home POD2-4    Abnormal glucose tolerance test (GTT) during pregnancy, antepartum  Early ; 3 hour WNL   Postpartum hemorrhage  QBL 1859, HgB 11.6 -> 7.5 -> 6.9 -> 1u pRBC -> 8.5  Continue to monitor for signs/symptoms of ABLA  Venofer ordered    OB Post-Partum Progress Note  Subjective   Post delivery. Patient is doing well. Lochia WNL. Pain well controlled. She reports feeling better than yesterday; dizziness has resolved. She does report some moderate incisional pain, as well as pain in her R shoulder that fluctuates with passing gas.    Pain: yes, cramping, improved with meds  Tolerating PO: yes  Voiding: spontaneously  Flatus: passing  BM: not yet  Ambulating: yes  Breastfeeding:  yes  Chest pain: no  Shortness of breath: no  Leg pain: no  Lochia: WNL    Objective :  Temp:  [97.6 °F (36.4 °C)-98.5 °F (36.9 °C)] 98.1 °F (36.7 °C)  HR:  [71-86] 76  BP: (103-120)/(58-76) 115/58  Resp:  [16-18] 17  SpO2:  [98 %-100 %] 100 %  O2 Device: None (Room air)    Physical Exam  Constitutional:       Appearance: Normal appearance.   Cardiovascular:      Rate and Rhythm: Normal rate.   Abdominal:      Comments: Incision clean/dry/intact  Fundus firm, below umbilicus   Musculoskeletal:      Right lower leg: No edema.      Left lower leg: No edema.   Skin:     General: Skin is warm and dry.    Neurological:      General: No focal deficit present.      Mental Status: She is alert and oriented to person, place, and time.         Lab Results: I have reviewed the following results:  Lab Results   Component Value Date    WBC 8.70 01/06/2025    HGB 8.5 (L) 01/06/2025    HCT 26.0 (L) 01/06/2025    MCV 84 01/06/2025     01/06/2025     Breanne Carty MD  OBGYN PGY2

## 2025-01-07 NOTE — ASSESSMENT & PLAN NOTE
QBL 1859, HgB 11.6 -> 7.5 -> 6.9 -> 1u pRBC -> 8.5  Continue to monitor for signs/symptoms of ABLA  Venofer ordered

## 2025-01-08 VITALS
SYSTOLIC BLOOD PRESSURE: 109 MMHG | HEIGHT: 58 IN | RESPIRATION RATE: 16 BRPM | OXYGEN SATURATION: 100 % | DIASTOLIC BLOOD PRESSURE: 64 MMHG | WEIGHT: 173.1 LBS | BODY MASS INDEX: 36.34 KG/M2 | HEART RATE: 70 BPM | TEMPERATURE: 98.3 F

## 2025-01-08 DIAGNOSIS — Z98.891 S/P CESAREAN SECTION: ICD-10-CM

## 2025-01-08 PROCEDURE — 99024 POSTOP FOLLOW-UP VISIT: CPT | Performed by: NURSE PRACTITIONER

## 2025-01-08 RX ORDER — SENNOSIDES 8.6 MG
1 TABLET ORAL DAILY PRN
Status: DISCONTINUED | OUTPATIENT
Start: 2025-01-08 | End: 2025-01-08 | Stop reason: HOSPADM

## 2025-01-08 RX ORDER — SENNOSIDES 8.6 MG
8.6 TABLET ORAL DAILY PRN
Start: 2025-01-08

## 2025-01-08 RX ORDER — IBUPROFEN 600 MG/1
600 TABLET, FILM COATED ORAL EVERY 6 HOURS
Qty: 30 TABLET | Refills: 0 | Status: SHIPPED | OUTPATIENT
Start: 2025-01-08

## 2025-01-08 RX ORDER — SENNOSIDES 8.6 MG
1 TABLET ORAL
Status: DISCONTINUED | OUTPATIENT
Start: 2025-01-08 | End: 2025-01-08

## 2025-01-08 RX ORDER — NALMEFENE HYDROCHLORIDE 2.7 MG/100UL
2.7 SPRAY NASAL SEE ADMIN INSTRUCTIONS
Qty: 1 EACH | Refills: 0 | Status: SHIPPED | OUTPATIENT
Start: 2025-01-08

## 2025-01-08 RX ORDER — OXYCODONE HYDROCHLORIDE 5 MG/1
5 TABLET ORAL EVERY 4 HOURS PRN
Qty: 5 TABLET | Refills: 0 | Status: SHIPPED | OUTPATIENT
Start: 2025-01-08

## 2025-01-08 RX ADMIN — POLYETHYLENE GLYCOL 3350 17 G: 17 POWDER, FOR SOLUTION ORAL at 02:22

## 2025-01-08 RX ADMIN — DOCUSATE SODIUM 100 MG: 100 CAPSULE, LIQUID FILLED ORAL at 09:02

## 2025-01-08 RX ADMIN — IBUPROFEN 600 MG: 600 TABLET, FILM COATED ORAL at 11:20

## 2025-01-08 RX ADMIN — ENOXAPARIN SODIUM 40 MG: 40 INJECTION SUBCUTANEOUS at 09:03

## 2025-01-08 RX ADMIN — IBUPROFEN 600 MG: 600 TABLET, FILM COATED ORAL at 05:29

## 2025-01-08 RX ADMIN — ACETAMINOPHEN 975 MG: 325 TABLET, FILM COATED ORAL at 11:20

## 2025-01-08 RX ADMIN — BACITRACIN ZINC 1 SMALL APPLICATION: 500 OINTMENT TOPICAL at 09:02

## 2025-01-08 RX ADMIN — ACETAMINOPHEN 975 MG: 325 TABLET, FILM COATED ORAL at 03:40

## 2025-01-08 RX ADMIN — OXYCODONE 5 MG: 5 TABLET ORAL at 07:46

## 2025-01-08 RX ADMIN — SENNOSIDES 8.6 MG: 8.6 TABLET, FILM COATED ORAL at 06:21

## 2025-01-08 NOTE — PLAN OF CARE
Problem: POSTPARTUM  Goal: Experiences normal postpartum course  Description: INTERVENTIONS:  - Monitor maternal vital signs  - Assess uterine involution and lochia  Outcome: Progressing  Goal: Appropriate maternal -  bonding  Description: INTERVENTIONS:  - Identify family support  - Assess for appropriate maternal/infant bonding   -Encourage maternal/infant bonding opportunities  - Referral to  or  as needed  Outcome: Progressing  Goal: Establishment of infant feeding pattern  Description: INTERVENTIONS:  - Assess breast/bottle feeding  - Refer to lactation as needed  Outcome: Progressing  Goal: Incision(s), wounds(s) or drain site(s) healing without S/S of infection  Description: INTERVENTIONS  - Assess and document dressing, incision, wound bed, drain sites and surrounding tissue  - Provide patient and family education  - Perform skin care/dressing changes every   Outcome: Progressing     Problem: INFECTION - ADULT  Goal: Absence or prevention of progression during hospitalization  Description: INTERVENTIONS:  - Assess and monitor for signs and symptoms of infection  - Monitor lab/diagnostic results  - Monitor all insertion sites, i.e. indwelling lines, tubes, and drains  - Monitor endotracheal if appropriate and nasal secretions for changes in amount and color  - [place] appropriate cooling/warming therapies per order  - Administer medications as ordered  - Instruct and encourage patient and family to use good hand hygiene technique  - Identify and instruct in appropriate isolation precautions for identified infection/condition  Outcome: Progressing  Goal: Absence of fever/infection during neutropenic period  Description: INTERVENTIONS:  - Monitor WBC    Outcome: Progressing

## 2025-01-08 NOTE — PROGRESS NOTES
Progress Note - OB/GYN   Name: Johnathon English 34 y.o. female I MRN: [id number]  Unit/Bed#: -01 I Date of Admission: 1/3/2025   Date of Service: 2025 I Hospital Day: 5     Assessment & Plan  S/P  section  Continue routine post partum care  QBL 1859, HgB 11.6 -> 7.5 -> 6.9 -> 1u pRBC -> 8.5 s/p venofer  Pain well controlled: tylenol/motrin scheduled, beverley prn  Lochia within normal limits: continue to monitor   OOB: as able, encourage ambulation  Passing flatus  Voiding spontaneously s/p void trial  DVT ppx: SCD, Lovenox 40mg to be ordered once HgB stabilizes  Encourage breastfeeding  Baby in: room  Dispo: anticipate d/c home POD2-4  PP contraception - condoms    Abnormal glucose tolerance test (GTT) during pregnancy, antepartum  Early ; 3 hour WNL   Postpartum hemorrhage  QBL 1859, HgB 11.6 -> 7.5 -> 6.9 -> 1u pRBC -> 8.5  Continue to monitor for signs/symptoms of ABLA  S/p TXA and methergine  Positive GBS test  Penicillin ordered  Premature rupture of membranes  Noted to be ruptured during PNV today with +ferning, +nitrazine, +pooling  SVE /-4  Admit for IOL in setting of PROM  Admission labs: CBC, T&S, syphilis screen  Anesthesia consulted  Clear liquid diet   Rh+, no Rhogam indicated  GBS+, prophylaxis indicated   IOL with PO cytotec, transition to pitocin; consents signed      Johnathon English is a patient of: [facility]. She is PPD# 3 s/p  primary  section, low transverse incision  Recovering well and is stable     Disposition    - Anticipate discharge home on PPD# 3      Subjective/Objective     Chief Complaint: Postpartum State     Subjective:    Johnathon English is PPD/POD#3 s/p  primary  section, low transverse incision. She has no current complaints.  Pain is well controlled. She is recovering well and is stable.     Feeding:  breast & formula   Tolerating PO: yes  Nausea or Vomiting: no  Voiding: yes  Flatus: yes; has had no bowel movement.  "  Ambulating: yes  Chest pain: no  Shortness of breath: no  Leg pain: no  Lochia: small/ moderate     Vitals:   /64 (BP Location: Right arm)   Pulse 70   Temp 98.3 °F (36.8 °C) (Oral)   Resp 16   Ht 4' 10\" (1.473 m)   Wt 78.5 kg (173 lb 1.6 oz)   LMP 03/31/2024 (Exact Date)   SpO2 100%   Breastfeeding Yes   BMI 36.18 kg/m²     Physical Exam:   GEN: Johnathon English appears well, alert and oriented x 3, pleasant and cooperative   CARDIO: RRR, no murmurs or rubs  RESP:  CTAB, no wheezes or rales  ABDOMEN: soft, no tenderness, no distention, fundus @ U-1, Incision C/D/I covered with steri strips  EXTREMITIES:  non tender, + 1 edema, b/l Hay's sign negative      Labs:     Hemoglobin   Date Value Ref Range Status   01/06/2025 8.5 (L) 11.5 - 15.4 g/dL Final   01/06/2025 6.9 (L) 11.5 - 15.4 g/dL Final     WBC   Date Value Ref Range Status   01/06/2025 8.70 4.31 - 10.16 Thousand/uL Final   01/06/2025 8.11 4.31 - 10.16 Thousand/uL Final     Platelets   Date Value Ref Range Status   01/06/2025 173 149 - 390 Thousands/uL Final   01/06/2025 141 (L) 149 - 390 Thousands/uL Final     Creatinine   Date Value Ref Range Status   07/11/2024 0.50 (L) 0.60 - 1.30 mg/dL Final     Comment:     Standardized to IDMS reference method     AST   Date Value Ref Range Status   07/11/2024 18 13 - 39 U/L Final     ALT   Date Value Ref Range Status   07/11/2024 12 7 - 52 U/L Final     Comment:     Specimen collection should occur prior to Sulfasalazine administration due to the potential for falsely depressed results.           ROCKY Boles  1/8/2025  8:39 AM                       "

## 2025-01-08 NOTE — LACTATION NOTE
This note was copied from a baby's chart.     01/08/25 0845   Lactation Consultation   Reason for Consult 20 minutes;15 min;10 mins;10 mn   Risk Factors Language barrier  (FOB translating)   Maternal Information   Has mother  before? No   Exclusive Pump and Bottle Feed No   LATCH Documentation   Latch 1   Audible Swallowing 1   Type of Nipple 2   Comfort (Breast/Nipple) 1   Hold (Positioning) 1   LATCH Score 6   Position(s) Used Football   Breasts/Nipples   Date Pumping Initiated 01/08/25   Time Pumping Initiated 0930   Left Breast Soft;Filling   Right Breast Soft;Filling   Left Nipple Flat;Everted;Blisters   Right Nipple Flat;Everted;Blisters   Intervention Lanolin;Breast pump   Breastfeeding Progress Not yet established   Other OB Lactation Tools   Feeding Devices Bottle   Patient Follow-Up   Lactation Consult Status 2   Follow-Up Type Inpatient;Outpatient;Call as needed   Other OB Lactation Documentation    Additional Problem Noted D/C: Assisted in latching baby at breast, baby unable to maintain latch. Family has been attempting to breastfeed and topping off with formula. Enc pt to pump. Discussed feeding plan for home. [facility] contacted for an appt.       Consulted with family to follow up on breastfeeding progress. FOB present to translate, patient understands some english.  Patient states having difficulty latching baby at the breast. Attempted to latch baby on both breasts, baby unable to maintain latch. Maternal anatomy and receiving bottle feeds may contribute to latching issues. Family has been attempting to breastfeed and then topping off with formula.    Feeding plan:  Patient is encouraged to attempt to breastfeed on demand, every 2-3 hours or when baby showing early feeding cues.  Discussed the importance of ensuring that baby feeds 8-12x in 24 hours and not waiting over 3 hours to feed.   Patient is encouraged to pump if baby is having short feeds or not having quality feeds at the  breast.  Offer baby expressed breast milk first and then top off with formula.    Demonstrated use of Zomee Z2 breast pump.  Instructions given on pumping.  Discussed duration, frequency, set up and different modes of the pump.  Reviewed supplies of pump, including assembly- placement of flanges and sizing of flanges.   Discussed labeling and storage of breast milk. Provided with [facility] breast milk storage guidelines (Citizen of [place] provided).  Discussed cleaning of breast pump parts.    Discussed healing properties of breast milk on nipples -hand expressing breast milk on nipples and allowing it to air dry can help heal nipples. Patient is also encouraged to pay close attention to latch and positioning. In addition, applying protective ointment after feeding and covering with an occlusive dressing may also help. Lanolin provided and educated on.    Discussed initial engorgement time frame and reviewed engorgement comfort measures.    Discussed community resources for continued breastfeeding support once discharged home. [facility] been contacted for a discharge appointment.    Parents were encouraged to call for further questions that arise prior to discharge.

## 2025-01-08 NOTE — ASSESSMENT & PLAN NOTE
QBL 1859, HgB 11.6 -> 7.5 -> 6.9 -> 1u pRBC -> 8.5  Continue to monitor for signs/symptoms of ABLA  S/p TXA and methergine

## 2025-01-08 NOTE — PLAN OF CARE
Problem: INFECTION - ADULT  Goal: Absence or prevention of progression during hospitalization  Description: INTERVENTIONS:  - Assess and monitor for signs and symptoms of infection  - Monitor lab/diagnostic results  - Monitor all insertion sites, i.e. indwelling lines, tubes, and drains  - Monitor endotracheal if appropriate and nasal secretions for changes in amount and color  - [place] appropriate cooling/warming therapies per order  - Administer medications as ordered  - Instruct and encourage patient and family to use good hand hygiene technique  - Identify and instruct in appropriate isolation precautions for identified infection/condition  Outcome: Progressing  Goal: Absence of fever/infection during neutropenic period  Description: INTERVENTIONS:  - Monitor WBC    Outcome: Progressing     Problem: POSTPARTUM  Goal: Experiences normal postpartum course  Description: INTERVENTIONS:  - Monitor maternal vital signs  - Assess uterine involution and lochia  Outcome: Progressing  Goal: Appropriate maternal -  bonding  Description: INTERVENTIONS:  - Identify family support  - Assess for appropriate maternal/infant bonding   -Encourage maternal/infant bonding opportunities  - Referral to  or  as needed  Outcome: Progressing  Goal: Establishment of infant feeding pattern  Description: INTERVENTIONS:  - Assess breast/bottle feeding  - Refer to lactation as needed  Outcome: Progressing  Goal: Incision(s), wounds(s) or drain site(s) healing without S/S of infection  Description: INTERVENTIONS  - Assess and document dressing, incision, wound bed, drain sites and surrounding tissue  - Provide patient and family education  Outcome: Progressing

## 2025-01-08 NOTE — PLAN OF CARE
Problem: POSTPARTUM  Goal: Experiences normal postpartum course  Description: INTERVENTIONS:  - Monitor maternal vital signs  - Assess uterine involution and lochia  2025 by Pamela Anderson RN  Outcome: Completed  2025 by Pamela Anderson RN  Outcome: Progressing  Goal: Appropriate maternal -  bonding  Description: INTERVENTIONS:  - Identify family support  - Assess for appropriate maternal/infant bonding   -Encourage maternal/infant bonding opportunities  - Referral to  or  as needed  2025 by Pamela Anderson RN  Outcome: Completed  2025 by Pamela Anderson RN  Outcome: Progressing  Goal: Establishment of infant feeding pattern  Description: INTERVENTIONS:  - Assess breast/bottle feeding  - Refer to lactation as needed  2025 by Pamela Anderson RN  Outcome: Completed  2025 by Pameal Anderson RN  Outcome: Progressing  Goal: Incision(s), wounds(s) or drain site(s) healing without S/S of infection  Description: INTERVENTIONS  - Assess and document dressing, incision, wound bed, drain sites and surrounding tissue  - Provide patient and family education  - Perform skin care/dressing changes every   2025 by Pamela Anderson RN  Outcome: Completed  2025 by Pamela Anderson RN  Outcome: Progressing     Problem: INFECTION - ADULT  Goal: Absence or prevention of progression during hospitalization  Description: INTERVENTIONS:  - Assess and monitor for signs and symptoms of infection  - Monitor lab/diagnostic results  - Monitor all insertion sites, i.e. indwelling lines, tubes, and drains  - Monitor endotracheal if appropriate and nasal secretions for changes in amount and color  - [place] appropriate cooling/warming therapies per order  - Administer medications as ordered  - Instruct and encourage patient and family to use good hand hygiene technique  - Identify and instruct in appropriate isolation precautions for  identified infection/condition  1/8/2025 1137 by Pamela Anderson RN  Outcome: Completed  1/8/2025 0351 by Pamela Anderson RN  Outcome: Progressing  Goal: Absence of fever/infection during neutropenic period  Description: INTERVENTIONS:  - Monitor WBC    1/8/2025 1137 by Pamela Anderson RN  Outcome: Completed  1/8/2025 0351 by Pamela Anderson RN  Outcome: Progressing

## 2025-01-08 NOTE — ASSESSMENT & PLAN NOTE
Continue routine post partum care  QBL 1859, HgB 11.6 -> 7.5 -> 6.9 -> 1u pRBC -> 8.5 s/p venofer  Pain well controlled: tylenol/motrin scheduled, beverley prn  Lochia within normal limits: continue to monitor   OOB: as able, encourage ambulation  Passing flatus  Voiding spontaneously s/p void trial  DVT ppx: SCD, Lovenox 40mg to be ordered once HgB stabilizes  Encourage breastfeeding  Baby in: room  Dispo: anticipate d/c home POD2-4  PP contraception - condoms

## 2025-01-09 NOTE — UTILIZATION REVIEW
"NOTIFICATION OF INPATIENT ADMISSION   MATERNITY/DELIVERY AUTHORIZATION REQUEST   SERVICING FACILITY:   [facility]  Parent Child Health - L&D, [place], NICU  [address] [place] [address]  Tax ID: [id number]  NPI: [id number] ATTENDING PROVIDER:  Attending Name and NPI#: Whitney Flores Md [[id number]]  Address: [address] [place] [address]  Phone: [phone]     ADMISSION INFORMATION:  Place of Service: Inpatient [facility]  Place of Service Code: 21  Inpatient Admission Date/Time: 1/3/25  4:29 PM  Discharge Date/Time: 2025  2:22 PM  Admitting Diagnosis Code/Description:  Vaginal discharge during pregnancy [O26.899, N89.8]   Mother: Johnathon English 1990 Estimated Date of Delivery: 25  Delivering clinician: Whitney Flores   OB History          1    Para   1    Term   1       0    AB   0    Living   1         SAB   0    IAB   0    Ectopic   0    Multiple   0    Live Births   1                Name & MRN:   Information for the patient's :  Tameka, Baby Boy (Johnathon) [[id number]]    Delivery Information:  Sex: male  Delivered 2025 10:28 AM by , Low Transverse; Gestational Age: 40w0d     Measurements:  Weight: 8 lb 8.2 oz (3860 g);  Height: 20.5\"    APGAR 1 minute 5 minutes 10 minutes   Totals: 8 9       UTILIZATION REVIEW CONTACT:  Thuy Minor, Utilization   Network Utilization Review Department  Phone: [phone]  Fax [phone]  Email: [email or web address]  Contact for approvals/pending authorizations, clinical reviews, and discharge.   PHYSICIAN ADVISORY SERVICES:  Medical Necessity Denial & Hgty-jn-Ivdi Review  Phone: [phone]  Fax: [phone]  Email: [email or web address]   DISCHARGE SUPPORT TEAM:  For Patients Discharge Needs & Updates  Phone: [phone] opt. 2 Fax: [phone]  Email: [email or web address]    Initial " Clinical Review    Admission: Date/Time/Statement:   Admission Orders (From admission, onward)       Ordered        25 1629  Inpatient Admission  Once                          Orders Placed This Encounter   Procedures    Inpatient Admission     Standing Status:   Standing     Number of Occurrences:   1     Level of Care:   Med Surg [16]     Estimated length of stay:   More than 2 Midnights     Certification:   I certify that inpatient services are medically necessary for this patient for a duration of greater than two midnights. See H&P and MD Progress Notes for additional information about the patient's course of treatment.         Chief Complaint   Patient presents with    Scheduled Induction       Initial Presentation: 34 y.o. female presented to L&D as inpatient admission for  at 39w5d admitted for PROM. Plan continuous external monitoring, IVF,cytotec ramirez balloon, IV pitocin if needed Epidural and IV MGSO 4 if needed and supportive care   +ferning, +nitrazine, +pooling   Cytotec placed  SVE: 4  FHT: cat 1    @ 2326  IV Pitocin started  Contraction Frequency (minutes): 1-4  Contraction Intensity: Mild/Moderate  Uterine Activity Characteristics: Regular  Cervical Dilation: 1  Cervical Effacement: 50  Fetal Station: -3  Baseline Rate (FHR): 145 bpm  Fetal Heart Rate (FHT): 154 BPM  FHR Category: 1     25  @ 0436  Dose (arnel-units/min) Oxytocin: 8 arnel-units/min  Contraction Frequency (minutes): 1-4  Contraction Intensity: Mild/Moderate  Uterine Activity Characteristics: Regular  Cervical Dilation: 2  Cervical Effacement: 70  Fetal Station: -3  Baseline Rate (FHR): 120 bpm  Fetal Heart Rate (FHT): 120 BPM  FHR Category: 1     @ 1539  SVE unchanged IV Pitocin off    PO Cytotec   Contraction Frequency (minutes): 1.5-2.5  Contraction Intensity: Mild/Moderate  Uterine Activity Characteristics: Regular  Cervical Dilation: 2  Cervical Effacement: 70  Fetal Station: -3  Baseline Rate (FHR):  150 bpm  Fetal Heart Rate (FHT): 120 BPM  FHR Category: cat 1    @   PO cytotec  Dose (arnel-units/min) Oxytocin: 0 arnel-units/min (Per Dr. Espinoza)  Contraction Frequency (minutes): irritability  Contraction Intensity: Mild/Moderate  Uterine Activity Characteristics: Irritability  Cervical Dilation: 2  Cervical Effacement: 70  Fetal Station: -3  Baseline Rate (FHR): 140 bpm  Fetal Heart Rate (FHT): 120 BPM  FHR Category: 1    25  @ 0233  Restart IV pitocin  Dose (arnel-units/min) Oxytocin: 0 arnel-units/min (Per Dr. Espinoza)  Contraction Frequency (minutes): 1.5-7  Contraction Intensity: Mild/Moderate  Uterine Activity Characteristics: Irregular  Cervical Dilation: 2  Cervical Effacement: 70  Fetal Station: -3  Baseline Rate (FHR): 130 bpm  Fetal Heart Rate (FHT): 130 BPM  FHR Category: 1     @ 0646  Dose (arnel-units/min) Oxytocin: 0 arnel-units/min  Contraction Frequency (minutes): 2  Contraction Intensity: Mild/Moderate  Uterine Activity Characteristics: Irregular  Cervical Dilation: 2  Cervical Effacement: 70  Fetal Station: -3  Baseline Rate (FHR): 135 bpm  Fetal Heart Rate (FHT): 120 BPM  FHR Category: 2  3 minute deceleration     @ 0748  Dose (arnel-units/min) Oxytocin: 0 arnel-units/min  Contraction Frequency (minutes): 2-4  Contraction Intensity: Mild/Moderate  Uterine Activity Characteristics: Irregular  Cervical Dilation: 2  Cervical Effacement: 70  Fetal Station: -3  Baseline Rate (FHR): 120 bpm  Fetal Heart Rate (FHT): 120 BPM  FHR Category: 1      @ 1012  Epidural placed     @ 40 w  Male @ 1028  Apgar 8  9  Wt 3860 grams  Infant dried suctioned stimulated and warmed. Responded well and taken to NBN           Scheduled Medications:  No current facility-administered medications for this encounter.    Continuous IV Infusions:  No current facility-administered medications for this encounter.    PRN Meds:  No current facility-administered medications for this encounter.    Admitting  Vitals    Temperature Pulse Respirations Blood Pressure SpO2 Pain Score   01/03/25 1629 01/03/25 1639 01/03/25 1639 01/03/25 1639 01/05/25 0621 01/03/25 1639   (!) 97.1 °F (36.2 °C) 92 18 121/74 100 % No Pain     Weight (last 2 days) before discharge       None            Vital Signs (last 3 days) before discharge       Date/Time Temp Pulse Resp BP MAP (mmHg) SpO2 O2 Device Cardiac (WDL) Patient Position - Orthostatic VS Pain    01/08/25 1120 -- -- -- -- -- -- -- -- -- 3    01/08/25 0746 -- -- -- -- -- -- -- -- -- 7    01/08/25 0700 98.3 °F (36.8 °C) 70 16 109/64 -- 100 % None (Room air) WDL Lying --    01/08/25 0529 -- -- -- -- -- -- -- -- -- 4    01/08/25 0340 -- -- -- -- -- -- -- -- -- 3    01/07/25 2306 -- -- -- -- -- -- -- -- -- Med Not Given for Pain - for MAR use only    01/07/25 2300 97.7 °F (36.5 °C) 72 16 119/65 -- 97 % None (Room air) WDL Sitting --    01/07/25 2139 -- -- -- -- -- -- -- -- -- Med Not Given for Pain - for MAR use only    01/07/25 2138 -- -- -- -- -- -- -- -- -- 9    01/07/25 1905 -- -- -- -- -- -- -- -- -- 6    01/07/25 1612 -- -- -- -- -- -- -- -- -- 8    01/07/25 1600 -- -- -- -- -- -- -- WDL -- --    01/07/25 1500 97.8 °F (36.6 °C) 85 16 115/77 86 100 % None (Room air) -- Lying --    01/07/25 1447 -- -- -- -- -- -- -- -- -- 9    01/07/25 0924 -- -- -- -- -- -- -- -- -- 9    01/07/25 0800 -- -- -- -- -- -- None (Room air) WDL -- --    01/07/25 0700 97.9 °F (36.6 °C) 79 16 118/70 87 100 % None (Room air) -- Sitting --    01/07/25 0647 -- -- -- -- -- -- -- -- -- 9    01/07/25 0352 -- -- -- -- -- -- -- -- -- 7    01/07/25 0300 98.1 °F (36.7 °C) 76 17 115/58 -- 100 % None (Room air) -- Sitting --    01/06/25 2300 97.6 °F (36.4 °C) 75 16 119/74 -- 98 % None (Room air) WD Sitting --    01/06/25 2135 -- -- -- -- -- -- -- -- -- Med Not Given for Pain - for MAR use only    01/06/25 1900 98.1 °F (36.7 °C) 82 17 118/67 -- 99 % None (Room air) -- Sitting --    01/06/25 1728 -- -- -- -- -- -- -- -- -- 9     01/06/25 1710 -- -- -- -- -- -- -- -- -- Med Not Given for Pain - for MAR use only    01/06/25 1542 98.1 °F (36.7 °C) 75 18 120/63 -- -- -- -- -- Med Not Given for Pain - for MAR use only    01/06/25 1500 -- -- -- -- -- -- None (Room air) WDL -- No Pain    01/06/25 1130 98.3 °F (36.8 °C) 71 16 112/61 -- 100 % -- -- -- --    01/06/25 1100 98.1 °F (36.7 °C) 72 18 118/76 -- 99 % None (Room air) -- Lying --    01/06/25 0924 98.5 °F (36.9 °C) 86 18 108/60 -- 98 % -- -- -- --    01/06/25 0903 98 °F (36.7 °C) 80 16 114/60 -- 98 % -- -- -- --    01/06/25 0730 -- -- -- -- -- -- -- WDL -- 2    01/06/25 0700 98.4 °F (36.9 °C) 74 16 103/66 -- 100 % None (Room air) -- Lying --    01/06/25 0541 -- -- -- -- -- -- -- -- -- 4    01/06/25 0325 97.5 °F (36.4 °C) 86 16 97/64 -- 98 % None (Room air) -- Sitting --    01/06/25 0322 -- -- -- -- -- -- -- -- -- 3    01/05/25 2356 -- -- -- -- -- -- -- -- -- 2    01/05/25 2303 98.7 °F (37.1 °C) 82 18 101/64 71 98 % None (Room air) WDL Sitting --    01/05/25 2121 -- -- -- -- -- -- -- -- -- 3    01/05/25 1923 98.7 °F (37.1 °C) 85 18 97/59 72 98 % None (Room air) -- Sitting --    01/05/25 1712 -- -- -- -- -- -- -- -- -- 4    01/05/25 1600 98.1 °F (36.7 °C) 60 18 109/56 -- 100 % None (Room air) WDL -- No Pain    01/05/25 1449 98 °F (36.7 °C) 66 18 108/54 -- 100 % None (Room air) WDL -- No Pain    01/05/25 1356 97.5 °F (36.4 °C) 75 22 117/56 -- 100 % None (Room air) WDL -- No Pain    01/05/25 1346 -- -- -- -- -- -- -- -- -- 7    01/05/25 1253 97.5 °F (36.4 °C) 57 18 101/57 -- 100 % None (Room air) WDL -- No Pain    01/05/25 1229 97.4 °F (36.3 °C) 69 17 89/53 -- 100 % None (Room air) WDL Lying No Pain    01/05/25 1208 -- -- -- 94/52 -- -- -- -- Lying --    01/05/25 1200 -- -- -- 109/46 -- -- -- -- Lying --    01/05/25 1155 -- -- -- 80/44 -- -- -- -- -- --    01/05/25 1150 -- 104 18 93/51 -- 100 % None (Room air) WDL Lying No Pain    01/05/25 1140 97.5 °F (36.4 °C) 93 18 102/56 -- 100 % None (Room  air) WDL Lying No Pain    01/05/25 1129 97.6 °F (36.4 °C) 73 18 115/61 -- 100 % None (Room air) WDL Lying No Pain    01/05/25 1120 97.6 °F (36.4 °C) 80 17 104/67 -- 100 % None (Room air) WDL Lying No Pain    01/05/25 0621 -- 80 -- -- -- 100 % -- -- -- --    01/05/25 0521 97.9 °F (36.6 °C) -- -- -- -- -- -- -- -- --    01/05/25 0413 98.4 °F (36.9 °C) 71 -- 106/57 -- -- -- -- -- --    01/05/25 0300 -- 70 -- 114/64 -- -- -- -- -- --    01/05/25 0200 98.5 °F (36.9 °C) -- -- -- -- -- -- -- -- --    01/05/25 0100 -- -- -- 125/65 -- -- -- -- -- --    01/05/25 0000 97.9 °F (36.6 °C) -- -- -- -- -- -- -- -- --              Pertinent Labs/Diagnostic Test Results:         Results from last 7 days   Lab Units 01/06/25  1619 01/06/25  0540 01/05/25  2219 01/03/25  1714   WBC Thousand/uL 8.70 8.11 8.97 6.55   HEMOGLOBIN g/dL 8.5* 6.9* 7.5* 11.6   HEMATOCRIT % 26.0* 21.1* 23.3* 35.7   PLATELETS Thousands/uL 173 141* 145* 197       Results from last 7 days   Lab Units 01/07/25  0726   UNIT PRODUCT CODE  [id number]  [id number]   UNIT NUMBER  [id number]  [id number]   UNITABO  B  B   UNITRH  POS  POS   CROSSMATCH  Compatible  Compatible   UNIT DISPENSE STATUS  Presumed Trans  Return to Inv   UNIT PRODUCT VOL mL 350  350       Past Medical History:   Diagnosis Date    History of chickenpox 02/2024     Present on Admission:   Abnormal glucose tolerance test (GTT) during pregnancy, antepartum   Positive GBS test      Admitting Diagnosis: Vaginal discharge during pregnancy [O26.899, N89.8]  Age/Sex: 34 y.o. female    Network Utilization Review Department  ATTENTION: Please call with any questions or concerns to [phone] and carefully listen to the prompts so that you are directed to the right person. All voicemails are confidential.   For Discharge needs, contact Care Management DC Support Team at [phone] opt. 2  Send all requests for admission clinical reviews, approved or denied determinations and any other  requests to dedicated fax number below belonging to the campus where the patient is receiving treatment. List of dedicated fax numbers for the Facilities:  FACILITY NAME UR FAX NUMBER   ADMISSION DENIALS (Administrative/Medical Necessity) [phone]   DISCHARGE SUPPORT TEAM (NETWORK) [phone]   PARENT CHILD HEALTH (Maternity/NICU/Pediatrics) [phone]   [facility] [phone]   [facility] [phone]   [facility] [phone]   [facility] [phone]   [facility] [phone]   [facility] [phone]   [facility] [phone]   [facility] [phone]   [facility] [phone]   [facility] [phone]   [facility] [phone]   [facility] [phone]     Discharge Summary - Johnathon English 34 y.o. female MRN: [id number]     Unit/Bed#: -01 Encounter: [id number]     ADMISSION  Admission Date: 1/3/2025   Admitting Attending: Dr. Whitney Flores MD  Admitting Diagnoses:   Problem List       Patient Active Problem List   Diagnosis    Overweight    S/P  section    Obesity in pregnancy, antepartum    Abnormal glucose tolerance test (GTT) during pregnancy, antepartum    Positive GBS test    Uterine contractions    Premature rupture of membranes    Postpartum hemorrhage            DELIVERY  Delivery Method: , Low Transverse 1LTCS  Delivery Date and Time: 2025 10:28 AM  Delivery Attending: Whitney Flores     DISCHARGE  Discharge Date: 25  Discharge Attending: Dr. Flores  Discharge Diagnosis:   Same, Delivered     Clinical course: Admission to Delivery  Johnathon English is a 34 y.o.  at 40w0d admitted for  premature rupture of membranes.  She was started on Cytotec and after 4 hours unchanged she was started on Pitocin because she was curt too much for Cytotec.  After 4 hours with minimal change of the cervix from 1 to 2 cm, IUPC was placed and subsequently fell out.  She was unchanged for 12 more hours and Pitocin was then turned off and the patient received a second dose of Cytotec as her contractions had spaced out.  She was unchanged on next check and patient was counseled extensively on recommendation for proceeding with  section as patient met criteria for failed induction with prolonged rupture of membranes.  However, patient was insistent on continuing the induction.  She then received a third dose of Cytotec and continued to remain at 2 cm.  Pitocin was then restarted at 3 AM and subsequently had a 3-minute deceleration at 6 AM and Pitocin was turned off.  Patient was counseled on recommendation for  section given now fetal intolerance to Pitocin and prolonged rupture of membranes and failed induction of labor.  Patient consented.  Owing to high acuity of labor patient on the floor, there was delay in proceeding to the operating room.        Delivery  Route of Delivery: , Low Transverse  Reason for  delivery: Other (Add Comments) failed induction     Anesthesia: Spinal,   QBL:  1859         Delivery: , Low Transverse at 2025 10:28 AM     Baby's Weight: 3860 g (8 lb 8.2 oz); 136.16    Apgar scores: 8  and 9  at 1 and 5 minutes, respectively        Clinical Course: Post-Delivery:  The post delivery course was unremarkable.     On the day of discharge, the patient was ambulating, voiding spontaneously, tolerating oral intake, and hemodynamically stable. She was able to reasonably perform all ADLs. She had appropriate bowel function. Pain was well-controlled. She was discharged home on postpartum/postop day #3 without complications. Patient was instructed to  follow up with her OB as an outpatient and was given appropriate warnings to call her provider with problems or concerns.     Pertinent lab findings included:      Last three Hgb values:        Lab Results   Component Value Date     HGB 8.5 (L) 2025     HGB 6.9 (L) 2025     HGB 7.5 (L) 2025         Problem-specific follow-up plans included the following:  Problem List                  Unprioritized     Overweight     * (Principal) S/P  section     Obesity in pregnancy, antepartum     Abnormal glucose tolerance test (GTT) during pregnancy, antepartum     Overview   Early ; 3 hour WNL           Positive GBS test     Uterine contractions     Premature rupture of membranes     Postpartum hemorrhage      Other Visit Diagnoses           Postpartum care and examination of lactating mother    -  Primary       Encounter for screening involving social determinants of health (SDoH)                    Discharge med list:  Contraception:  condoms     Medication List      START taking these medications     ibuprofen 600 mg tablet; Commonly known as: MOTRIN; Take 1 tablet (600   mg total) by mouth every 6 (six) hours   naloxone 4 mg/0.1 mL nasal spray; Commonly known as: NARCAN; Administer   1 spray into a nostril. If no response after 2-3 minutes, give another   dose in the other nostril using a new spray.   oxyCODONE 5 immediate release tablet; Commonly known as: ROXICODONE;   Take 1 tablet (5 mg total) by mouth every 4 (four) hours as needed for   moderate pain for up to 5 doses Max Daily Amount: 30 mg   senna 8.6 mg; Commonly known as: SENOKOT; Take 1 tablet (8.6 mg total)   by mouth daily as needed for constipation     CONTINUE taking these medications     acetaminophen 650 mg CR tablet; Commonly known as: TYLENOL; Take 1   tablet (650 mg total) by mouth every 8 (eight) hours as needed for mild   pain   PRENATAL VITAMIN PO        Condition at discharge:   good      Disposition:   See After  Visit Summary for discharge disposition information.     Planned Readmission:   No     ROCKY Boles                     Cosigned by: Whitney Flores MD at 1/8/2025  9:22 AM     Revision History

## 2025-01-10 ENCOUNTER — TELEPHONE (OUTPATIENT)
Dept: OBGYN CLINIC | Facility: MEDICAL CENTER | Age: 35
End: 2025-01-10

## 2025-01-10 NOTE — TELEPHONE ENCOUNTER
OB nurse navigator attempted to get in contact with patient for post partum assessment call but unable to complete call at this time. Patient unavailable. VM full unable to leave message. [id number] message sent.    Please schedule postpartum visit when patient returns call. C/S 1/6.

## 2025-01-13 LAB — PLACENTA IN STORAGE: NORMAL

## 2025-02-07 ENCOUNTER — POSTPARTUM VISIT (OUTPATIENT)
Age: 35
End: 2025-02-07

## 2025-02-07 VITALS
BODY MASS INDEX: 30.64 KG/M2 | HEIGHT: 58 IN | SYSTOLIC BLOOD PRESSURE: 118 MMHG | WEIGHT: 146 LBS | DIASTOLIC BLOOD PRESSURE: 70 MMHG

## 2025-02-07 DIAGNOSIS — N89.8 VAGINAL DISCHARGE: Primary | ICD-10-CM

## 2025-02-07 DIAGNOSIS — Z98.891 S/P CESAREAN SECTION: ICD-10-CM

## 2025-02-07 DIAGNOSIS — D50.9 IRON DEFICIENCY ANEMIA, UNSPECIFIED IRON DEFICIENCY ANEMIA TYPE: ICD-10-CM

## 2025-02-07 PROCEDURE — 87480 CANDIDA DNA DIR PROBE: CPT | Performed by: OBSTETRICS & GYNECOLOGY

## 2025-02-07 PROCEDURE — 87510 GARDNER VAG DNA DIR PROBE: CPT | Performed by: OBSTETRICS & GYNECOLOGY

## 2025-02-07 PROCEDURE — 87660 TRICHOMONAS VAGIN DIR PROBE: CPT | Performed by: OBSTETRICS & GYNECOLOGY

## 2025-02-07 PROCEDURE — 99024 POSTOP FOLLOW-UP VISIT: CPT | Performed by: OBSTETRICS & GYNECOLOGY

## 2025-02-07 NOTE — PROGRESS NOTES
"OB POSTPARTUM VISIT PROGRESS NOTE  Date of Encounter: 2025    Johnathon English    : 1990  (34 y.o.)  MR: [id number]    Subjective   Johnathon is in for her postpartum visit. She is now . She delivered by primary  section. She's generally doing well, denies current pain or bleeding issues, and has no significant depression issues. She is breast feeding with some supplementation. We discussed all appropriate contraceptive options and she chooses nexplanon , states having green and yellow discharge and concerned with infection          Objective   EXAM:  GENERAL: alert, well appearing, and in no distress and oriented to person, place, and time.  VITALS: Blood pressure 118/70, height 4' 10\" (1.473 m), weight 66.2 kg (146 lb), last menstrual period 2024, currently breastfeeding.  BMI: Body mass index is 30.51 kg/m².    LUNGS:  unlabored  ABDOMEN: Regular, soft depressible non  tender  , incision well healed    EXTREMITIES: All normal. No edema.  PELVIC:   VULVA: Nml EGBUS.   VAGINA: Introitus well healed, slightly tender.   CERVIX: normal light lochia  no cervical motions tenderness     UTERUS: Anteverted, NSSC, Mobile, NT.     Assessment & Plan   Diagnoses and all orders for this visit:    Vaginal discharge  -     Vaginosis DNA Probe  -     Urine culture; Future  -     Urine culture    Iron deficiency anemia, unspecified iron deficiency anemia type  -     CBC and differential; Future  -     CBC and differential    S/P  section    Deisres nexplanon for contraception  Will return for placement       Blanca Ashby MD    "

## 2025-02-08 LAB
CANDIDA RRNA VAG QL PROBE: NOT DETECTED
G VAGINALIS RRNA GENITAL QL PROBE: NOT DETECTED
T VAGINALIS RRNA GENITAL QL PROBE: NOT DETECTED

## 2025-02-10 ENCOUNTER — RESULTS FOLLOW-UP (OUTPATIENT)
Dept: OBGYN CLINIC | Facility: MEDICAL CENTER | Age: 35
End: 2025-02-10

## 2025-02-24 LAB
BACTERIA UR CULT: ABNORMAL
BASOPHILS # BLD AUTO: 0 X10E3/UL (ref 0–0.2)
BASOPHILS NFR BLD AUTO: 0 %
EOSINOPHIL # BLD AUTO: 0.2 X10E3/UL (ref 0–0.4)
EOSINOPHIL NFR BLD AUTO: 2 %
ERYTHROCYTE [DISTWIDTH] IN BLOOD BY AUTOMATED COUNT: 14.5 % (ref 11.7–15.4)
HCT VFR BLD AUTO: 38.5 % (ref 34–46.6)
HGB BLD-MCNC: 12.4 G/DL (ref 11.1–15.9)
IMM GRANULOCYTES # BLD: 0 X10E3/UL (ref 0–0.1)
IMM GRANULOCYTES NFR BLD: 0 %
LYMPHOCYTES # BLD AUTO: 3 X10E3/UL (ref 0.7–3.1)
LYMPHOCYTES NFR BLD AUTO: 43 %
Lab: ABNORMAL
MCH RBC QN AUTO: 27.3 PG (ref 26.6–33)
MCHC RBC AUTO-ENTMCNC: 32.2 G/DL (ref 31.5–35.7)
MCV RBC AUTO: 85 FL (ref 79–97)
MONOCYTES # BLD AUTO: 0.5 X10E3/UL (ref 0.1–0.9)
MONOCYTES NFR BLD AUTO: 7 %
NEUTROPHILS # BLD AUTO: 3.3 X10E3/UL (ref 1.4–7)
NEUTROPHILS NFR BLD AUTO: 48 %
PLATELET # BLD AUTO: 258 X10E3/UL (ref 150–450)
RBC # BLD AUTO: 4.54 X10E6/UL (ref 3.77–5.28)
WBC # BLD AUTO: 7 X10E3/UL (ref 3.4–10.8)

## 2025-04-03 ENCOUNTER — NURSE TRIAGE (OUTPATIENT)
Age: 35
End: 2025-04-03

## 2025-04-03 NOTE — TELEPHONE ENCOUNTER
" FOLLOW UP: Scheduled appt     REASON FOR CONVERSATION: Abdominal Pain    SYMPTOMS: Abd pain, pain with urination/low back pain     OTHER: patient with low pelvic pain for 3 days as well as pain with urination. Describes it as ovary pain. Also notes low back pain. Requesting appt for eval.     DISPOSITION: See Today or Tomorrow in Office    Reason for Disposition   MILD pain (e.g., does not interfere with normal activities) and pain comes and goes (cramps) lasts > 48 hours  (Exception: This same abdominal pain is a chronic symptom recurrent or ongoing AND present > 4 weeks.)    Answer Assessment - Initial Assessment Questions  1. LOCATION: \"Where does it hurt?\"       Ovary pain, lower part of abdomen   2. RADIATION: \"Does the pain shoot anywhere else?\" (e.g., chest, back)      Sometimes lower back, full body   3. ONSET: \"When did the pain begin?\" (e.g., minutes, hours or days ago)       3 days   4. SUDDEN: \"Gradual or sudden onset?\"      Gradual   5. PATTERN \"Does the pain come and go, or is it constant?\"      Comes and goes   6. SEVERITY: \"How bad is the pain?\"  (e.g., Scale 1-10; mild, moderate, or severe)      6-7/10  7. RECURRENT SYMPTOM: \"Have you ever had this type of stomach pain before?\" If Yes, ask: \"When was the last time?\" and \"What happened that time?\"       Denies   8. CAUSE: \"What do you think is causing the stomach pain?\"      Unsure   9. RELIEVING/AGGRAVATING FACTORS: \"What makes it better or worse?\" (e.g., antacids, bending or twisting motion, bowel movement)      Pain with urination   10. OTHER SYMPTOMS: \"Do you have any other symptoms?\" (e.g., back pain, diarrhea, fever, urination pain, vomiting)        Pain with urination  11. PREGNANCY: \"Is there any chance you are pregnant?\" \"When was your last menstrual period?\"        LMP 03/10, Denies    Protocols used: Abdominal Pain - Female-Adult-[place]    "

## 2025-04-04 ENCOUNTER — TELEPHONE (OUTPATIENT)
Dept: OTHER | Facility: OTHER | Age: 35
End: 2025-04-04

## 2025-04-04 NOTE — TELEPHONE ENCOUNTER
Patient canceled apt for this morning with [facility] [employer].  She will need to reschedule and would like a call back.     Thank you

## 2025-07-07 ENCOUNTER — TELEPHONE (OUTPATIENT)
Age: 35
End: 2025-07-07

## 2025-07-07 NOTE — TELEPHONE ENCOUNTER
Patient unable to get into her [facility].    She thinks she has some missed messages and wants to make sure she doesn't have any upcoming appointments. Advised patient no upcoming appointments and do not see any recent missed messages.    Patient given phone number for [facility] support.     Denies further questions at this time.

## (undated) DEVICE — GLOVE SRG BIOGEL ECLIPSE 6.5

## (undated) DEVICE — GLOVE INDICATOR PI UNDERGLOVE SZ 7 BLUE

## (undated) DEVICE — TELFA NON-ADHERENT ABSORBENT DRESSING: Brand: TELFA

## (undated) DEVICE — KIT,BETHLEHEM C SECT DELIVERY: Brand: CARDINAL HEALTH

## (undated) DEVICE — SUT MONOCRYL 4-0 PS-2 27 IN Y426H

## (undated) DEVICE — 3M™ STERI-STRIP™ REINFORCED ADHESIVE SKIN CLOSURES, R1547, 1/2 IN X 4 IN (12 MM X 100 MM), 6 STRIPS/ENVELOPE: Brand: 3M™ STERI-STRIP™

## (undated) DEVICE — SCD SEQUENTIAL COMPRESSION COMFORT SLEEVE MEDIUM KNEE LENGTH: Brand: KENDALL SCD

## (undated) DEVICE — SWABSTCK, BENZOIN TINCTURE, 1/PK, STRL: Brand: APLICARE

## (undated) DEVICE — ABDOMINAL PAD: Brand: DERMACEA

## (undated) DEVICE — WOUND RETRACTOR AND PROTECTOR: Brand: ALEXIS O WOUND PROTECTOR-RETRACTOR

## (undated) DEVICE — SUT VICRYL 0 CTX 36 IN J978H

## (undated) DEVICE — SUT PLAIN 3-0 CT-1 27 IN 842H

## (undated) DEVICE — SUT VICRYL 0 CT-1 36 IN J946H

## (undated) DEVICE — CHLORAPREP HI-LITE 26ML ORANGE

## (undated) DEVICE — ABG MICROSTICKS SAFETY

## (undated) DEVICE — SUT MONOCRYL 0 CT-1 27 IN Y340H

## (undated) DEVICE — GAUZE SPONGES,16 PLY: Brand: CURITY